# Patient Record
Sex: FEMALE | Race: WHITE | Employment: UNEMPLOYED | URBAN - METROPOLITAN AREA
[De-identification: names, ages, dates, MRNs, and addresses within clinical notes are randomized per-mention and may not be internally consistent; named-entity substitution may affect disease eponyms.]

---

## 2022-07-28 ENCOUNTER — APPOINTMENT (OUTPATIENT)
Dept: CT IMAGING | Age: 87
DRG: 689 | End: 2022-07-28
Payer: MEDICARE

## 2022-07-28 ENCOUNTER — APPOINTMENT (OUTPATIENT)
Dept: GENERAL RADIOLOGY | Age: 87
DRG: 689 | End: 2022-07-28
Payer: MEDICARE

## 2022-07-28 ENCOUNTER — HOSPITAL ENCOUNTER (INPATIENT)
Age: 87
LOS: 4 days | Discharge: NURSING, REHAB OR LONG-TERM CARE | DRG: 689 | End: 2022-08-02
Attending: EMERGENCY MEDICINE | Admitting: STUDENT IN AN ORGANIZED HEALTH CARE EDUCATION/TRAINING PROGRAM
Payer: MEDICARE

## 2022-07-28 DIAGNOSIS — E86.0 DEHYDRATION: ICD-10-CM

## 2022-07-28 DIAGNOSIS — R41.82 ALTERED MENTAL STATUS, UNSPECIFIED ALTERED MENTAL STATUS TYPE: ICD-10-CM

## 2022-07-28 DIAGNOSIS — N39.0 URINARY TRACT INFECTION WITH HEMATURIA, SITE UNSPECIFIED: Primary | ICD-10-CM

## 2022-07-28 DIAGNOSIS — R31.9 URINARY TRACT INFECTION WITH HEMATURIA, SITE UNSPECIFIED: Primary | ICD-10-CM

## 2022-07-28 LAB
-: ABNORMAL
ABSOLUTE EOS #: 0.2 K/UL (ref 0–0.4)
ABSOLUTE LYMPH #: 1.7 K/UL (ref 1–4.8)
ABSOLUTE MONO #: 0.6 K/UL (ref 0.1–1.2)
ALBUMIN SERPL-MCNC: 3.4 G/DL (ref 3.5–5.2)
ALBUMIN/GLOBULIN RATIO: 1.5 (ref 1–2.5)
ALP BLD-CCNC: 85 U/L (ref 35–104)
ALT SERPL-CCNC: 10 U/L (ref 5–33)
ANION GAP SERPL CALCULATED.3IONS-SCNC: 11 MMOL/L (ref 9–17)
AST SERPL-CCNC: 24 U/L
BACTERIA: ABNORMAL
BASOPHILS # BLD: 0 % (ref 0–2)
BASOPHILS ABSOLUTE: 0 K/UL (ref 0–0.2)
BILIRUB SERPL-MCNC: 0.6 MG/DL (ref 0.3–1.2)
BILIRUBIN URINE: ABNORMAL
BUN BLDV-MCNC: 19 MG/DL (ref 8–23)
CALCIUM SERPL-MCNC: 10.2 MG/DL (ref 8.6–10.4)
CASTS UA: ABNORMAL /LPF
CASTS UA: ABNORMAL /LPF
CHLORIDE BLD-SCNC: 105 MMOL/L (ref 98–107)
CO2: 28 MMOL/L (ref 20–31)
COLOR: YELLOW
CREAT SERPL-MCNC: 1.07 MG/DL (ref 0.5–0.9)
EOSINOPHILS RELATIVE PERCENT: 4 % (ref 1–4)
EPITHELIAL CELLS UA: ABNORMAL /HPF (ref 0–5)
GFR AFRICAN AMERICAN: 59 ML/MIN
GFR NON-AFRICAN AMERICAN: 49 ML/MIN
GFR SERPL CREATININE-BSD FRML MDRD: ABNORMAL ML/MIN/{1.73_M2}
GLUCOSE BLD-MCNC: 98 MG/DL (ref 70–99)
GLUCOSE URINE: NEGATIVE
HCT VFR BLD CALC: 26.9 % (ref 36–46)
HEMOGLOBIN: 9 G/DL (ref 12–16)
INR BLD: 1
KETONES, URINE: ABNORMAL
LACTIC ACID, SEPSIS: 0.9 MMOL/L (ref 0.5–1.9)
LEUKOCYTE ESTERASE, URINE: NEGATIVE
LIPASE: 48 U/L (ref 13–60)
LYMPHOCYTES # BLD: 36 % (ref 24–44)
MAGNESIUM: 1.4 MG/DL (ref 1.6–2.6)
MCH RBC QN AUTO: 29.4 PG (ref 26–34)
MCHC RBC AUTO-ENTMCNC: 33.6 G/DL (ref 31–37)
MCV RBC AUTO: 87.4 FL (ref 80–100)
MONOCYTES # BLD: 12 % (ref 2–11)
NITRITE, URINE: NEGATIVE
OTHER OBSERVATIONS UA: ABNORMAL
PARTIAL THROMBOPLASTIN TIME: 23.2 SEC (ref 21.3–31.3)
PDW BLD-RTO: 19.2 % (ref 12.5–15.4)
PH UA: 6 (ref 5–8)
PLATELET # BLD: 339 K/UL (ref 140–450)
PMV BLD AUTO: 9.1 FL (ref 6–12)
POTASSIUM SERPL-SCNC: 3.3 MMOL/L (ref 3.7–5.3)
PROTEIN UA: ABNORMAL
PROTHROMBIN TIME: 10.2 SEC (ref 9.4–12.6)
RBC # BLD: 3.08 M/UL (ref 4–5.2)
RBC UA: ABNORMAL /HPF (ref 0–2)
SEG NEUTROPHILS: 48 % (ref 36–66)
SEGMENTED NEUTROPHILS ABSOLUTE COUNT: 2.3 K/UL (ref 1.8–7.7)
SODIUM BLD-SCNC: 144 MMOL/L (ref 135–144)
SPECIFIC GRAVITY UA: 1.02 (ref 1–1.03)
TOTAL PROTEIN: 5.6 G/DL (ref 6.4–8.3)
TROPONIN, HIGH SENSITIVITY: 85 NG/L (ref 0–14)
TURBIDITY: ABNORMAL
URINE HGB: NEGATIVE
UROBILINOGEN, URINE: NORMAL
WBC # BLD: 4.8 K/UL (ref 3.5–11)
WBC UA: ABNORMAL /HPF (ref 0–5)

## 2022-07-28 PROCEDURE — 83735 ASSAY OF MAGNESIUM: CPT

## 2022-07-28 PROCEDURE — 70450 CT HEAD/BRAIN W/O DYE: CPT

## 2022-07-28 PROCEDURE — 80053 COMPREHEN METABOLIC PANEL: CPT

## 2022-07-28 PROCEDURE — 84484 ASSAY OF TROPONIN QUANT: CPT

## 2022-07-28 PROCEDURE — 99285 EMERGENCY DEPT VISIT HI MDM: CPT

## 2022-07-28 PROCEDURE — 83690 ASSAY OF LIPASE: CPT

## 2022-07-28 PROCEDURE — 85730 THROMBOPLASTIN TIME PARTIAL: CPT

## 2022-07-28 PROCEDURE — 96365 THER/PROPH/DIAG IV INF INIT: CPT

## 2022-07-28 PROCEDURE — 2580000003 HC RX 258: Performed by: EMERGENCY MEDICINE

## 2022-07-28 PROCEDURE — 81001 URINALYSIS AUTO W/SCOPE: CPT

## 2022-07-28 PROCEDURE — 85610 PROTHROMBIN TIME: CPT

## 2022-07-28 PROCEDURE — 36415 COLL VENOUS BLD VENIPUNCTURE: CPT

## 2022-07-28 PROCEDURE — 85025 COMPLETE CBC W/AUTO DIFF WBC: CPT

## 2022-07-28 PROCEDURE — 71045 X-RAY EXAM CHEST 1 VIEW: CPT

## 2022-07-28 PROCEDURE — 83605 ASSAY OF LACTIC ACID: CPT

## 2022-07-28 PROCEDURE — 70496 CT ANGIOGRAPHY HEAD: CPT

## 2022-07-28 PROCEDURE — 6360000004 HC RX CONTRAST MEDICATION: Performed by: EMERGENCY MEDICINE

## 2022-07-28 PROCEDURE — 93005 ELECTROCARDIOGRAM TRACING: CPT | Performed by: EMERGENCY MEDICINE

## 2022-07-28 RX ORDER — ACETAMINOPHEN 325 MG/1
650 TABLET ORAL EVERY 6 HOURS PRN
COMMUNITY

## 2022-07-28 RX ORDER — MAGNESIUM OXIDE 400 MG/1
400 TABLET ORAL DAILY
COMMUNITY

## 2022-07-28 RX ORDER — 0.9 % SODIUM CHLORIDE 0.9 %
1000 INTRAVENOUS SOLUTION INTRAVENOUS ONCE
Status: COMPLETED | OUTPATIENT
Start: 2022-07-28 | End: 2022-07-28

## 2022-07-28 RX ORDER — LEVOFLOXACIN 5 MG/ML
500 INJECTION, SOLUTION INTRAVENOUS ONCE
Status: COMPLETED | OUTPATIENT
Start: 2022-07-28 | End: 2022-07-29

## 2022-07-28 RX ORDER — ONDANSETRON 4 MG/1
4 TABLET, FILM COATED ORAL EVERY 8 HOURS PRN
COMMUNITY

## 2022-07-28 RX ORDER — 0.9 % SODIUM CHLORIDE 0.9 %
80 INTRAVENOUS SOLUTION INTRAVENOUS ONCE
Status: DISCONTINUED | OUTPATIENT
Start: 2022-07-28 | End: 2022-07-29

## 2022-07-28 RX ORDER — SODIUM CHLORIDE 0.9 % (FLUSH) 0.9 %
10 SYRINGE (ML) INJECTION PRN
Status: DISCONTINUED | OUTPATIENT
Start: 2022-07-28 | End: 2022-07-29

## 2022-07-28 RX ORDER — OMEPRAZOLE 20 MG/1
20 CAPSULE, DELAYED RELEASE ORAL 2 TIMES DAILY
COMMUNITY

## 2022-07-28 RX ORDER — NITROFURANTOIN 25; 75 MG/1; MG/1
100 CAPSULE ORAL 2 TIMES DAILY
Status: ON HOLD | COMMUNITY
End: 2022-08-01 | Stop reason: HOSPADM

## 2022-07-28 RX ADMIN — SODIUM CHLORIDE 1000 ML: 9 INJECTION, SOLUTION INTRAVENOUS at 22:15

## 2022-07-28 RX ADMIN — IOPAMIDOL 100 ML: 755 INJECTION, SOLUTION INTRAVENOUS at 23:54

## 2022-07-28 RX ADMIN — Medication 80 ML: at 23:57

## 2022-07-28 RX ADMIN — SODIUM CHLORIDE, PRESERVATIVE FREE 10 ML: 5 INJECTION INTRAVENOUS at 23:58

## 2022-07-28 ASSESSMENT — ENCOUNTER SYMPTOMS
ABDOMINAL PAIN: 0
DIARRHEA: 0
BACK PAIN: 0
COUGH: 0
VOMITING: 0

## 2022-07-29 ENCOUNTER — APPOINTMENT (OUTPATIENT)
Dept: CT IMAGING | Age: 87
DRG: 689 | End: 2022-07-29
Payer: MEDICARE

## 2022-07-29 PROBLEM — G92.9 TOXIC ENCEPHALOPATHY: Status: ACTIVE | Noted: 2022-07-29

## 2022-07-29 PROBLEM — C18.8 OVERLAPPING MALIGNANT NEOPLASM OF COLON (HCC): Status: ACTIVE | Noted: 2022-07-29

## 2022-07-29 PROBLEM — I82.409 DVT (DEEP VENOUS THROMBOSIS) (HCC): Status: ACTIVE | Noted: 2022-07-29

## 2022-07-29 PROBLEM — R41.82 CHANGE IN MENTAL STATUS: Status: ACTIVE | Noted: 2022-07-29

## 2022-07-29 LAB
ABSOLUTE EOS #: 0.1 K/UL (ref 0–0.4)
ABSOLUTE LYMPH #: 1 K/UL (ref 1–4.8)
ABSOLUTE MONO #: 0.4 K/UL (ref 0.1–1.2)
ANION GAP SERPL CALCULATED.3IONS-SCNC: 11 MMOL/L (ref 9–17)
BASOPHILS # BLD: 1 % (ref 0–2)
BASOPHILS ABSOLUTE: 0 K/UL (ref 0–0.2)
BUN BLDV-MCNC: 16 MG/DL (ref 8–23)
CALCIUM SERPL-MCNC: 9.4 MG/DL (ref 8.6–10.4)
CHLORIDE BLD-SCNC: 107 MMOL/L (ref 98–107)
CO2: 25 MMOL/L (ref 20–31)
CREAT SERPL-MCNC: 0.97 MG/DL (ref 0.5–0.9)
EOSINOPHILS RELATIVE PERCENT: 3 % (ref 1–4)
GFR AFRICAN AMERICAN: >60 ML/MIN
GFR NON-AFRICAN AMERICAN: 54 ML/MIN
GFR SERPL CREATININE-BSD FRML MDRD: ABNORMAL ML/MIN/{1.73_M2}
GLUCOSE BLD-MCNC: 87 MG/DL (ref 70–99)
HCT VFR BLD CALC: 23.3 % (ref 36–46)
HCT VFR BLD CALC: 24.5 % (ref 36–46)
HEMOGLOBIN: 7.7 G/DL (ref 12–16)
HEMOGLOBIN: 8.2 G/DL (ref 12–16)
INR BLD: 1.1
LYMPHOCYTES # BLD: 24 % (ref 24–44)
MAGNESIUM: 1.2 MG/DL (ref 1.6–2.6)
MCH RBC QN AUTO: 29.4 PG (ref 26–34)
MCH RBC QN AUTO: 29.7 PG (ref 26–34)
MCHC RBC AUTO-ENTMCNC: 33.1 G/DL (ref 31–37)
MCHC RBC AUTO-ENTMCNC: 33.4 G/DL (ref 31–37)
MCV RBC AUTO: 89 FL (ref 80–100)
MCV RBC AUTO: 89 FL (ref 80–100)
MONOCYTES # BLD: 9 % (ref 2–11)
PARTIAL THROMBOPLASTIN TIME: 22.1 SEC (ref 21.3–31.3)
PDW BLD-RTO: 19 % (ref 12.5–15.4)
PDW BLD-RTO: 19.1 % (ref 12.5–15.4)
PLATELET # BLD: 298 K/UL (ref 140–450)
PLATELET # BLD: 308 K/UL (ref 140–450)
PMV BLD AUTO: 8.4 FL (ref 6–12)
PMV BLD AUTO: 8.7 FL (ref 6–12)
POTASSIUM SERPL-SCNC: 3.2 MMOL/L (ref 3.7–5.3)
PROTHROMBIN TIME: 11.4 SEC (ref 9.4–12.6)
RBC # BLD: 2.62 M/UL (ref 4–5.2)
RBC # BLD: 2.76 M/UL (ref 4–5.2)
SEG NEUTROPHILS: 63 % (ref 36–66)
SEGMENTED NEUTROPHILS ABSOLUTE COUNT: 2.8 K/UL (ref 1.8–7.7)
SODIUM BLD-SCNC: 143 MMOL/L (ref 135–144)
TROPONIN, HIGH SENSITIVITY: 89 NG/L (ref 0–14)
TROPONIN, HIGH SENSITIVITY: 90 NG/L (ref 0–14)
TROPONIN, HIGH SENSITIVITY: 91 NG/L (ref 0–14)
WBC # BLD: 3.9 K/UL (ref 3.5–11)
WBC # BLD: 4.4 K/UL (ref 3.5–11)

## 2022-07-29 PROCEDURE — 36415 COLL VENOUS BLD VENIPUNCTURE: CPT

## 2022-07-29 PROCEDURE — 99222 1ST HOSP IP/OBS MODERATE 55: CPT | Performed by: STUDENT IN AN ORGANIZED HEALTH CARE EDUCATION/TRAINING PROGRAM

## 2022-07-29 PROCEDURE — 85025 COMPLETE CBC W/AUTO DIFF WBC: CPT

## 2022-07-29 PROCEDURE — 85610 PROTHROMBIN TIME: CPT

## 2022-07-29 PROCEDURE — 97166 OT EVAL MOD COMPLEX 45 MIN: CPT

## 2022-07-29 PROCEDURE — 85027 COMPLETE CBC AUTOMATED: CPT

## 2022-07-29 PROCEDURE — 1210000000 HC MED SURG R&B

## 2022-07-29 PROCEDURE — 97162 PT EVAL MOD COMPLEX 30 MIN: CPT

## 2022-07-29 PROCEDURE — 2580000003 HC RX 258: Performed by: STUDENT IN AN ORGANIZED HEALTH CARE EDUCATION/TRAINING PROGRAM

## 2022-07-29 PROCEDURE — 93308 TTE F-UP OR LMTD: CPT

## 2022-07-29 PROCEDURE — 80048 BASIC METABOLIC PNL TOTAL CA: CPT

## 2022-07-29 PROCEDURE — 83735 ASSAY OF MAGNESIUM: CPT

## 2022-07-29 PROCEDURE — 97530 THERAPEUTIC ACTIVITIES: CPT

## 2022-07-29 PROCEDURE — 97535 SELF CARE MNGMENT TRAINING: CPT

## 2022-07-29 PROCEDURE — 2580000003 HC RX 258: Performed by: CLINICAL NURSE SPECIALIST

## 2022-07-29 PROCEDURE — 6360000002 HC RX W HCPCS: Performed by: EMERGENCY MEDICINE

## 2022-07-29 PROCEDURE — 85730 THROMBOPLASTIN TIME PARTIAL: CPT

## 2022-07-29 PROCEDURE — 94760 N-INVAS EAR/PLS OXIMETRY 1: CPT

## 2022-07-29 PROCEDURE — 6360000002 HC RX W HCPCS: Performed by: CLINICAL NURSE SPECIALIST

## 2022-07-29 PROCEDURE — 93321 DOPPLER ECHO F-UP/LMTD STD: CPT

## 2022-07-29 PROCEDURE — 74176 CT ABD & PELVIS W/O CONTRAST: CPT

## 2022-07-29 PROCEDURE — 6360000002 HC RX W HCPCS: Performed by: STUDENT IN AN ORGANIZED HEALTH CARE EDUCATION/TRAINING PROGRAM

## 2022-07-29 RX ORDER — SODIUM CHLORIDE 0.9 % (FLUSH) 0.9 %
10 SYRINGE (ML) INJECTION PRN
Status: DISCONTINUED | OUTPATIENT
Start: 2022-07-29 | End: 2022-08-02 | Stop reason: HOSPADM

## 2022-07-29 RX ORDER — POLYETHYLENE GLYCOL 3350 17 G/17G
17 POWDER, FOR SOLUTION ORAL DAILY PRN
Status: DISCONTINUED | OUTPATIENT
Start: 2022-07-29 | End: 2022-08-02 | Stop reason: HOSPADM

## 2022-07-29 RX ORDER — SODIUM CHLORIDE 9 MG/ML
INJECTION, SOLUTION INTRAVENOUS CONTINUOUS
Status: DISCONTINUED | OUTPATIENT
Start: 2022-07-29 | End: 2022-08-01

## 2022-07-29 RX ORDER — ACETAMINOPHEN 325 MG/1
650 TABLET ORAL EVERY 6 HOURS PRN
Status: DISCONTINUED | OUTPATIENT
Start: 2022-07-29 | End: 2022-07-29 | Stop reason: SDUPTHER

## 2022-07-29 RX ORDER — HEPARIN SODIUM 1000 [USP'U]/ML
80 INJECTION, SOLUTION INTRAVENOUS; SUBCUTANEOUS ONCE
Status: COMPLETED | OUTPATIENT
Start: 2022-07-29 | End: 2022-07-29

## 2022-07-29 RX ORDER — POTASSIUM CHLORIDE 7.45 MG/ML
10 INJECTION INTRAVENOUS PRN
Status: DISCONTINUED | OUTPATIENT
Start: 2022-07-29 | End: 2022-08-02 | Stop reason: HOSPADM

## 2022-07-29 RX ORDER — ENOXAPARIN SODIUM 100 MG/ML
1.5 INJECTION SUBCUTANEOUS DAILY
Status: DISCONTINUED | OUTPATIENT
Start: 2022-07-29 | End: 2022-07-29

## 2022-07-29 RX ORDER — ENOXAPARIN SODIUM 100 MG/ML
50 INJECTION SUBCUTANEOUS ONCE
Status: COMPLETED | OUTPATIENT
Start: 2022-07-29 | End: 2022-07-29

## 2022-07-29 RX ORDER — ACETAMINOPHEN 325 MG/1
650 TABLET ORAL EVERY 6 HOURS PRN
Status: DISCONTINUED | OUTPATIENT
Start: 2022-07-29 | End: 2022-08-02 | Stop reason: HOSPADM

## 2022-07-29 RX ORDER — SODIUM CHLORIDE 0.9 % (FLUSH) 0.9 %
5-40 SYRINGE (ML) INJECTION EVERY 12 HOURS SCHEDULED
Status: DISCONTINUED | OUTPATIENT
Start: 2022-07-29 | End: 2022-08-02 | Stop reason: HOSPADM

## 2022-07-29 RX ORDER — HEPARIN SODIUM 10000 [USP'U]/100ML
5-30 INJECTION, SOLUTION INTRAVENOUS CONTINUOUS
Status: DISCONTINUED | OUTPATIENT
Start: 2022-07-29 | End: 2022-08-01

## 2022-07-29 RX ORDER — HEPARIN SODIUM 1000 [USP'U]/ML
80 INJECTION, SOLUTION INTRAVENOUS; SUBCUTANEOUS PRN
Status: DISCONTINUED | OUTPATIENT
Start: 2022-07-29 | End: 2022-08-01

## 2022-07-29 RX ORDER — PANTOPRAZOLE SODIUM 40 MG/1
40 TABLET, DELAYED RELEASE ORAL
Status: DISCONTINUED | OUTPATIENT
Start: 2022-07-29 | End: 2022-08-02 | Stop reason: HOSPADM

## 2022-07-29 RX ORDER — LANOLIN ALCOHOL/MO/W.PET/CERES
400 CREAM (GRAM) TOPICAL DAILY
Status: DISCONTINUED | OUTPATIENT
Start: 2022-07-29 | End: 2022-08-02 | Stop reason: HOSPADM

## 2022-07-29 RX ORDER — POTASSIUM CHLORIDE 20 MEQ/1
40 TABLET, EXTENDED RELEASE ORAL PRN
Status: DISCONTINUED | OUTPATIENT
Start: 2022-07-29 | End: 2022-08-02 | Stop reason: HOSPADM

## 2022-07-29 RX ORDER — ROSUVASTATIN CALCIUM 5 MG/1
10 TABLET, COATED ORAL NIGHTLY
Status: DISCONTINUED | OUTPATIENT
Start: 2022-07-29 | End: 2022-08-02 | Stop reason: HOSPADM

## 2022-07-29 RX ORDER — ACETAMINOPHEN 650 MG/1
650 SUPPOSITORY RECTAL EVERY 6 HOURS PRN
Status: DISCONTINUED | OUTPATIENT
Start: 2022-07-29 | End: 2022-08-02 | Stop reason: HOSPADM

## 2022-07-29 RX ORDER — POTASSIUM CHLORIDE 7.45 MG/ML
10 INJECTION INTRAVENOUS
Status: COMPLETED | OUTPATIENT
Start: 2022-07-29 | End: 2022-07-29

## 2022-07-29 RX ORDER — SODIUM CHLORIDE 9 MG/ML
INJECTION, SOLUTION INTRAVENOUS PRN
Status: DISCONTINUED | OUTPATIENT
Start: 2022-07-29 | End: 2022-08-02 | Stop reason: HOSPADM

## 2022-07-29 RX ORDER — ONDANSETRON 4 MG/1
4 TABLET, FILM COATED ORAL EVERY 8 HOURS PRN
Status: DISCONTINUED | OUTPATIENT
Start: 2022-07-29 | End: 2022-08-02 | Stop reason: HOSPADM

## 2022-07-29 RX ORDER — ENOXAPARIN SODIUM 100 MG/ML
1 INJECTION SUBCUTANEOUS ONCE
Status: CANCELLED | OUTPATIENT
Start: 2022-07-29 | End: 2022-07-29

## 2022-07-29 RX ORDER — MAGNESIUM SULFATE 1 G/100ML
1000 INJECTION INTRAVENOUS PRN
Status: DISCONTINUED | OUTPATIENT
Start: 2022-07-29 | End: 2022-08-02 | Stop reason: HOSPADM

## 2022-07-29 RX ORDER — HEPARIN SODIUM 1000 [USP'U]/ML
40 INJECTION, SOLUTION INTRAVENOUS; SUBCUTANEOUS PRN
Status: DISCONTINUED | OUTPATIENT
Start: 2022-07-29 | End: 2022-08-01

## 2022-07-29 RX ADMIN — POTASSIUM CHLORIDE 10 MEQ: 7.46 INJECTION, SOLUTION INTRAVENOUS at 20:27

## 2022-07-29 RX ADMIN — MAGNESIUM SULFATE HEPTAHYDRATE 1000 MG: 1 INJECTION, SOLUTION INTRAVENOUS at 12:54

## 2022-07-29 RX ADMIN — CEFTRIAXONE SODIUM 1000 MG: 1 INJECTION, POWDER, FOR SOLUTION INTRAMUSCULAR; INTRAVENOUS at 12:07

## 2022-07-29 RX ADMIN — POTASSIUM CHLORIDE 10 MEQ: 7.46 INJECTION, SOLUTION INTRAVENOUS at 18:35

## 2022-07-29 RX ADMIN — MAGNESIUM SULFATE HEPTAHYDRATE 1000 MG: 1 INJECTION, SOLUTION INTRAVENOUS at 15:19

## 2022-07-29 RX ADMIN — HEPARIN SODIUM AND DEXTROSE 18 UNITS/KG/HR: 10000; 5 INJECTION INTRAVENOUS at 17:14

## 2022-07-29 RX ADMIN — HEPARIN SODIUM 3940 UNITS: 1000 INJECTION INTRAVENOUS; SUBCUTANEOUS at 17:14

## 2022-07-29 RX ADMIN — POTASSIUM CHLORIDE 10 MEQ: 7.46 INJECTION, SOLUTION INTRAVENOUS at 19:34

## 2022-07-29 RX ADMIN — MAGNESIUM SULFATE HEPTAHYDRATE 1000 MG: 1 INJECTION, SOLUTION INTRAVENOUS at 10:09

## 2022-07-29 RX ADMIN — SODIUM CHLORIDE: 9 INJECTION, SOLUTION INTRAVENOUS at 17:00

## 2022-07-29 RX ADMIN — SODIUM CHLORIDE: 9 INJECTION, SOLUTION INTRAVENOUS at 03:43

## 2022-07-29 RX ADMIN — ENOXAPARIN SODIUM 50 MG: 100 INJECTION SUBCUTANEOUS at 03:41

## 2022-07-29 RX ADMIN — SODIUM CHLORIDE, PRESERVATIVE FREE 10 ML: 5 INJECTION INTRAVENOUS at 09:55

## 2022-07-29 RX ADMIN — LEVOFLOXACIN 500 MG: 5 INJECTION, SOLUTION INTRAVENOUS at 00:08

## 2022-07-29 RX ADMIN — POTASSIUM CHLORIDE 10 MEQ: 7.46 INJECTION, SOLUTION INTRAVENOUS at 17:21

## 2022-07-29 RX ADMIN — MAGNESIUM SULFATE HEPTAHYDRATE 1000 MG: 1 INJECTION, SOLUTION INTRAVENOUS at 16:20

## 2022-07-29 NOTE — PROGRESS NOTES
Pt failed bedside swallow test at this time; daughter Glory Meadows informed Dovie Opitz that pt has recently had difficulty swallowing; Dr Farrah Scott updated

## 2022-07-29 NOTE — PROGRESS NOTES
Admitted from ER to room 346. Pt alert, but not answering writers questions; appears confused / just says \"Help\". Support and reassurance provided for pt. Admission documentation initiated; vital signs obtained; initial assessment completed; warm blanket provided for pt; bed alarm on; pt oriented to call light and safety precautions - needs reinforcement; writer will contact family for more information on pt.    Pt currently resting comfortably; continue to monitor

## 2022-07-29 NOTE — PROGRESS NOTES
Occupational Therapy  Facility/Department: [facility] [place]  Occupational Therapy Initial Assessment    Name: Radha Garcia  : 1935  MRN: [id number]  Date of Service: 2022    Chief Complaint   Patient presents with    Altered Mental Status     Brought by squad- altered mental status d/t untreated UTI -pt is refusing abx        Discharge Recommendations:  Patient would benefit from continued therapy after discharge       Patient Diagnosis(es): The primary encounter diagnosis was Urinary tract infection with hematuria, site unspecified. Diagnoses of Altered mental status, unspecified altered mental status type and Dehydration were also pertinent to this visit. Past Medical History:  has a past medical history of Anemia, Atrial fibrillation ([address] Cognitive communication deficit, Colon cancer ([facility] [address] Deep venous thrombosis of lower extremity ([address] GI bleed, Hyperlipidemia, Malnutrition ([facility] [address] Muscle weakness, and Rhabdomyolysis. Past Surgical History:  has a past surgical history that includes Colon surgery. Assessment   Performance deficits / Impairments: Decreased functional mobility ; Decreased endurance;Decreased coordination;Decreased ADL status; Decreased ROM; Decreased balance;Decreased strength;Decreased high-level IADLs;Decreased safe awareness;Decreased cognition;Decreased fine motor control  Assessment: Pt ability to perform functional tasks significantly limited by deficits listed above also including pain. Pt would benefit from continued skilled occupational therapy throughout acute care stay and at discharge for increasing participation in functional tasks. Based on current level of function, pt requires 24hr assist/supervision. Prognosis: Good  Decision Making: Medium Complexity  REQUIRES OT FOLLOW-UP: Yes  Activity Tolerance  Activity Tolerance: Patient limited by pain; Patient limited by fatigue;Patient Tolerated treatment well;Treatment limited secondary to decreased cognition        Education Given To: Patient; Family  Education Provided: Role of Therapy;Plan of Care;Transfer Training  Education Method: Verbal  Barriers to Learning: None  Education Outcome: Unable to demonstrate understanding;Continued education needed    Safety Devices  Type of Devices: Bed alarm in place;Call light within reach;Gait belt;Patient at risk for falls; Left in bed;Nurse notified  Restraints  Restraints Initially in Place: No      Plan   Plan  Times per Week: 4-6x/wk  Times per Day: Daily  Current Treatment Recommendations: Strengthening, Balance training, Endurance training, Functional mobility training, Patient/Caregiver education & training, Safety education & training, Pain management, Self-Care / ADL, Home management training       Restrictions  Restrictions/Precautions  Restrictions/Precautions: Fall Risk  Required Braces or Orthoses?: No  Position Activity Restriction  Other position/activity restrictions: Up with assist      Subjective   General  Patient assessed for rehabilitation services?: Yes  General Comment  Comments: RN ok'd pt to be seen for therapy this PM. Pt was pleasantly confused throughout with periodic tearfulness about son. Pt unable to provide numeric score for pain. Social/Functional History  Social/Functional History  Lives With: Alone  Type of Home: Condo  Home Layout: One level  Home Access: Stairs to enter with rails  Entrance Stairs - Number of Steps: 2  Entrance Stairs - Rails:  (has handhold/grab bar)  Home Equipment: nj [place] Walker, 4 wheeled  Receives Help From: Family  ADL Assistance: Independent  Homemaking Assistance: Independent  Ambulation Assistance: Independent  Transfer Assistance: Independent  Active : No  Patient's  Info: has been several months since she has driven; family transports  Occupation: Retired  Additional Comments: Pt had colon CA surgery in June and has not been home since.  Pt has been at [address] [facility] for rehab and plans to return there at discharge. Objective   Vision  Vision: Impaired  Vision Exceptions: Wears glasses for reading  Hearing  Hearing: Within functional limits    Bed Mobility Training  Bed Mobility Training: Yes  Overall Level of Assistance: Assist X2;Maximum assistance  Interventions: Verbal cues (initiation)  Supine to Sit: Maximum assistance;Assist X2;Additional time; Adaptive equipment (Pt performed supine>sit with max Ax2 with HOB raised ~40*. Pt with difficulty intitating task.)  Sit to Supine: Maximum assistance; Adaptive equipment; Additional time;Assist X2 (Pt performed sit>supine with max Ax2 with HOB raised ~40*. Pt with difficulty intitating task.)    Balance  Sitting: Intact (Pt seated EOB unsupported for ~12min both statically and dynamically.)  Standing: Impaired (Pt stood EOB ~45-60s with max Ax2 with [place] for balance.)    Transfer Training  Transfer Training: Yes  Overall Level of Assistance: Maximum assistance;Assist X2;Additional time  Interventions: Verbal cues (Initiation)  Sit to Stand: Maximum assistance; Additional time;Assist X2 (Pt performed sit>stand from EOB with max Ax2 with [place] to perform. Pt with additional time to complete due to difficulty initiating task.)  Stand to Sit: Additional time;Maximum assistance;Assist X2 (Pt performed stand>sit wth max Ax2 with [place] onto EOB. Pt returned to sitting after asking to sit.)    Functional Mobility  Overall Level of Assistance:  (Further OOB activity not attempted due to poor standing balance.)    AROM: Generally decreased, functional (Pt with difficulty following commands for AROM.  Pt able to perform shoulder flexion [facility].)  Strength: Grossly decreased, non-functional (Not able to test due to cognitive limitations)  Coordination: Grossly decreased, non-functional (Not able to test due to cognitive limitations)  Tone: Normal (BUE)  Sensation: Impaired (Pt unable to state numbness/tingling)    ADL  Feeding: Verbal cueing;Maximum assistance;Setup  Grooming: Maximum assistance;Verbal cueing; Increased time to complete; Adaptive equipment  Grooming Skilled Clinical Factors: Pt performed face washing seated EOB. Therapist placed washcloth in pt hand and directed pt to wash face. Pt able to perform face washing with max VC and periodic [place]. UE Bathing: Maximum assistance;Verbal cueing; Increased time to complete; Adaptive equipment  LE Bathing: Maximum assistance;Verbal cueing; Increased time to complete; Adaptive equipment  UE Dressing: Maximum assistance;Verbal cueing; Adaptive equipment; Increased time to complete  LE Dressing: Maximum assistance;Verbal cueing; Increased time to complete; Adaptive equipment  Toileting: Maximum assistance; Increased time to complete; Adaptive equipment    Cognition  Overall Cognitive Status: Exceptions  Arousal/Alertness: Delayed responses to stimuli  Following Commands: Follows one step commands with increased time  Attention Span: Attends with cues to redirect; Difficulty attending to directions  Safety Judgement: Decreased awareness of need for assistance;Decreased awareness of need for safety  Insights: Decreased awareness of deficits  Initiation: Requires cues for all  Sequencing: Requires cues for all    Orientation  Overall Orientation Status: Impaired  Orientation Level: Oriented to person;Disoriented to place; Disoriented to time      AM-PAC Score  AM-[facility] Inpatient Daily Activity Raw Score: 12 (07/29/22 1530)  AM-PAC Inpatient ADL T-Scale Score : 30.6 (07/29/22 1530)  ADL Inpatient CMS 0-100% Score: 66.57 (07/29/22 1530)  ADL Inpatient CMS G-Code Modifier : CL (07/29/22 1530)      Goals  Short Term Goals  Time Frame for Short term goals: by discharge  Short Term Goal 1: pt will tolerate 3+min of standing during functional task performance with use of least restrictive AD  Short Term Goal 2: pt will perform UB ADLs with min A and min VC with use of least restrictive AE/DME  Short Term Goal 3: pt will perform LB ADLs with mod A and min VC with use of least restrictive AE/DME  Short Term Goal 4: pt will perform functional transfer/mobility with min A and use of least restrictive AD  Short Term Goal 5: pt will perform table top activity in sitting unsupported for 15+min with min VC      Therapy Time   Individual Concurrent Group Co-treatment   Time In 1353         Time Out 1430         Minutes 37         Timed Code Treatment Minutes: 10 Minutes    Evaluation/treatment performed by Student OT under the supervision of co-signing OT who agrees with all evaluation/treatment and documentation.      Varsha Leon S/OT

## 2022-07-29 NOTE — ED NOTES
Pt transferred to inpatient bed for comfort.  Assisted pt with repositioning onto right side, assisted pt with swabbing mouth and chap stick, call light in reach      Quillian Gosselin, RN  07/29/22 [address] [facility] [address], RN  07/29/22 2401

## 2022-07-29 NOTE — CARE COORDINATION
07/29/22 1430   Service Assessment   Patient Orientation Unable to Assess   Cognition Dementia / Early Alzheimer's   [address] is: Legal Next of Kin   Social/Functional History   Lives With Alone   Type of [address] One [address]   [facility] [address] to enter with rails   Entrance Stairs - Number of Steps 2   [address], rolling;Walker, 4 wheeled   [address]   Transfer Assistance Independent   Active  No   Patient's [place] has been several months since she has driven; family transports   Occupation Retired   Discharge Planning   Patient expects to be discharged to: [address] Dis[address]   [address] Discharge [facility] (SNF)   Mode of Transport at Discharge BLS   Condition of Participation: Discharge Planning   The Patient and/or Patient Representative was provided with a Choice of Provider? Patient   The Patient and/Or Patient Representative agree with the Discharge Plan? Yes   Freedom of Choice list was provided with basic dialogue that supports the patient's individualized plan of care/goals, treatment preferences, and shares the quality data associated with the providers?   Yes       Pt was at [address] [place] [address] for skilled, daughter unsure if she wants patient to return

## 2022-07-29 NOTE — ED NOTES
Depends saturated, karsten care done, pt turned and repositioned into right side, call light in reach, will continue to monitor      Amarilis Peres RN  07/29/22 [id number]

## 2022-07-29 NOTE — ED PROVIDER NOTES
[id number] [facility] ED  [address] [address] [facility] [address] [facility] [address]  Phone: [phone]  Fax: [phone] [facility]          Pt Name: Rosalva Mock  MRN: [id number]  [place] 1935  Date of evaluation: 7/28/2022      CHIEF COMPLAINT       Chief Complaint   Patient presents with    Altered Mental Status     Brought by squad- altered mental status d/t untreated UTI -pt is refusing abx        HISTORY OF PRESENT ILLNESS       Rosalva Mock is a 80 y.o. female who presents with altered mental status for about the past 2 to 3 days. She has had decreased communication and now refusing to take her medications. The nursing care facility thought that she may have a urinary tract infection however they have not yet been able to obtain a sample. No trauma. She is currently in the nursing care facility for rehab. At the end of June of this year she was living independently and when she had a fall at that time was diagnosed with colon cancer. Had a partial colectomy that is healing well. Just had the rest of her staples removed. 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 No further trauma reported. Patient is answering questions appropriately intermittently and not all the time. No other symptoms at this time. Review of systems primarily comes from the patient's daughter and EMS/nursing home. REVIEW OF SYSTEMS       Review of Systems   Unable to perform ROS: Mental status change   Constitutional:  Negative for fever. Respiratory:  Negative for cough. Cardiovascular:  Negative for chest pain. Gastrointestinal:  Negative for abdominal pain, diarrhea and vomiting. Musculoskeletal:  Negative for back pain and neck pain. Skin:  Negative for rash. Neurological:  Negative for seizures and headaches. Psychiatric/Behavioral:  Positive for confusion.        PAST MEDICAL HISTORY has a past medical history of Anemia, Atrial fibrillation ([facility] [address] Cognitive communication deficit, Colon cancer ([facility] [address] Deep venous thrombosis of lower extremity ([facility] [address] GI bleed, Hyperlipidemia, Malnutrition ([facility] [address] Muscle weakness, and Rhabdomyolysis. SURGICAL HISTORY      has a past surgical history that includes Colon surgery. CURRENT MEDICATIONS       Previous Medications    ACETAMINOPHEN (TYLENOL) 325 MG TABLET    Take 650 mg by mouth every 6 hours as needed for Pain    APIXABAN (ELIQUIS) 5 MG TABS TABLET    Take 5 mg by mouth in the morning and 5 mg before bedtime. MAGNESIUM OXIDE (MAG-OX) 400 MG TABLET    Take 400 mg by mouth in the morning. NITROFURANTOIN, MACROCRYSTAL-MONOHYDRATE, (MACROBID) 100 MG CAPSULE    Take 100 mg by mouth in the morning and 100 mg before bedtime. OMEPRAZOLE (PRILOSEC) 20 MG DELAYED RELEASE CAPSULE    Take 20 mg by mouth in the morning and 20 mg in the evening. ONDANSETRON (ZOFRAN) 4 MG TABLET    Take 4 mg by mouth every 8 hours as needed for Nausea or Vomiting    ROSUVASTATIN CALCIUM 10 MG CPSP    Take by mouth daily       ALLERGIES     has No Known Allergies. FAMILY HISTORY     has no family status information on file. family history is not on file. SOCIAL HISTORY      reports that she has never smoked. She has never been exposed to tobacco smoke. She has never used smokeless tobacco. She reports that she does not currently use alcohol. PHYSICAL EXAM     INITIAL VITALS:  oral temperature is 97.7 °F (36.5 °C). Her blood pressure is 138/73 and her pulse is 85. Her respiration is 16 and oxygen saturation is 98%. Physical Exam  Vitals reviewed. Constitutional:       General: She is not in acute distress. Appearance: She is well-developed. She is not ill-appearing or toxic-appearing. HENT:      Head: Normocephalic and atraumatic.       Right Ear: External ear normal.      Left Ear: External ear normal.   Eyes:      General: Lids are normal. Neck:      Trachea: No tracheal deviation. Cardiovascular:      Rate and Rhythm: Normal rate and regular rhythm. Pulmonary:      Effort: Pulmonary effort is normal. No respiratory distress. Abdominal:      Palpations: Abdomen is soft. Tenderness: There is no abdominal tenderness. Skin:     General: Skin is warm and dry. Neurological:      Mental Status: She is alert. GCS: GCS eye subscore is 4. GCS verbal subscore is 5. GCS motor subscore is 6. Comments: Patient does not answer my questions however she is awake, alert and looks around. No focal deficits and she has equal  strength bilaterally and is able to wiggle her toes equally bilaterally. Difficult to assess sensation or coordination. Cranial nerves appear to be intact from what I am able to examine. Psychiatric:         Speech: Speech normal.         DIFFERENTIAL DIAGNOSIS/ MDM:     Plan will be to initiate a further altered mental status work-up. She does not appear to be in any distress. Suspicion low for CVA. She is well outside of the tPA window. DIAGNOSTIC RESULTS     EKG: All EKG's are interpreted by the Emergency Department Physician who either signs or Co-signs this chart in the absence of a cardiologist.  Interpreted by Marybel Bruce DO     Rhythm: sinus   Rate: 85  Axis: Normal  Ectopy: Sinus arrhythmia but otherwise no other ectopy. Conduction: Sinus rhythm with sinus arrhythmia. ST Segments: No elevation or depression  T Waves: Generalized flattening of the T waves but otherwise no acute findings. Clinical Impression: Nonspecific ECG. Sinus rhythm. No acute infarction/ischemia noted. RADIOLOGY:   Interpretation per the Radiologist below, if available at the time of this note:  CTA HEAD NECK W CONTRAST   Preliminary Result   No evidence of stenosis or plaques in the bilateral common carotid arteries,   bilateral internal carotid arteries and bilateral vertebral arteries combine   neck. No evidence of any vascular compromise in the bilateral intracranial cerebral   arteries of the anterior and posterior circulations. Multiple nonenhancing hypodense lesions in the thyroid gland, most likely due   to multiple cysts. CT HEAD WO CONTRAST   Final Result   No acute intracranial abnormality. XR CHEST PORTABLE   Final Result   Minor left basilar atelectasis and or pleural effusion. No other acute   abnormality evident. No results found.     LABS:  Results for orders placed or performed during the hospital encounter of 07/28/22   Lactate, Sepsis   Result Value Ref Range    Lactic Acid, Sepsis 0.9 0.5 - 1.9 mmol/L   Comprehensive Metabolic Panel   Result Value Ref Range    Glucose 98 70 - 99 mg/dL    BUN 19 8 - 23 mg/dL    Creatinine 1.07 (H) 0.50 - 0.90 mg/dL    Calcium 10.2 8.6 - 10.4 mg/dL    Sodium 144 135 - 144 mmol/L    Potassium 3.3 (L) 3.7 - 5.3 mmol/L    Chloride 105 98 - 107 mmol/L    CO2 28 20 - 31 mmol/L    Anion Gap 11 9 - 17 mmol/L    Alkaline Phosphatase 85 35 - 104 U/L    ALT 10 5 - 33 U/L    AST 24 <32 U/L    Total Bilirubin 0.60 0.3 - 1.2 mg/dL    Total Protein 5.6 (L) 6.4 - 8.3 g/dL    Albumin 3.4 (L) 3.5 - 5.2 g/dL    Albumin/Globulin Ratio 1.5 1.0 - 2.5    GFR Non- 49 (L) >60 mL/min    GFR  59 (L) >60 mL/min    GFR Comment         CBC with Auto Differential   Result Value Ref Range    WBC 4.8 3.5 - 11.0 k/uL    RBC 3.08 (L) 4.0 - 5.2 m/uL    Hemoglobin 9.0 (L) 12.0 - 16.0 g/dL    Hematocrit 26.9 (L) 36 - 46 %    MCV 87.4 80 - 100 fL    MCH 29.4 26 - 34 pg    MCHC 33.6 31 - 37 g/dL    RDW 19.2 (H) 12.5 - 15.4 %    Platelets 372 696 - 689 k/uL    MPV 9.1 6.0 - 12.0 fL    Seg Neutrophils 48 36 - 66 %    Lymphocytes 36 24 - 44 %    Monocytes 12 (H) 2 - 11 %    Eosinophils % 4 1 - 4 %    Basophils 0 0 - 2 %    Segs Absolute 2.30 1.8 - 7.7 k/uL    Absolute Lymph # 1.70 1.0 - 4.8 k/uL    Absolute Mono # 0.60 0.1 - 1.2 k/uL Tract Infection    iopamidol (ISOVUE-370) 76 % injection 100 mL    sodium chloride flush 0.9 % injection 10 mL    0.9 % sodium chloride bolus        Vitals:    Vitals:    07/28/22 2123 07/28/22 2307   BP: (!) 141/61 138/73   Pulse: 86 85   Resp: 12 16   Temp: 97.7 °F (36.5 °C)    TempSrc: Oral    SpO2: 98% 98%     -------------------------  BP: 138/73, Temp: 97.7 °F (36.5 °C), Heart Rate: 85, Resp: 16      Re-evaluation Notes    Patient doing well on reevaluation. She seems to be more alert and appropriate with some hydration. We will cycle she was somewhat dehydrated. Imaging shows no acute findings. Urinalysis does show some evidence of urinary tract infection. The urine was a cath specimen however does have significant amount of epithelial cells. Despite this we will put the patient on antibiotics. Plan to be to admit. Cardiac enzymes are slightly elevated however no evidence of infarction or ischemia on her ECG at this time. She also denies chest pain. I spoke with the hospitalist who excepted admission of the patient. Patient family agreeable with this plan. We will put the patient on Lovenox since she is not able to take her Eliquis. CONSULTS:    None    CRITICAL CARE:     None    PROCEDURES:    None    FINAL IMPRESSION      1. Urinary tract infection with hematuria, site unspecified    2. Altered mental status, unspecified altered mental status type    3. Dehydration          DISPOSITION/PLAN   DISPOSITION Decision To Admit 07/29/2022 12:11:43 AM      Condition on Disposition    Improved    PATIENT REFERRED TO:  No follow-up provider specified.     DISCHARGE MEDICATIONS:  New Prescriptions    No medications on file       (Please note that portions of this note were completed with a voice recognition program.  Efforts were made to edit the dictations but occasionally words are mis-transcribed.)    Preston De La Rosa DO,   Attending Emergency Physician         Preston De La Rosa DO  07/29/22 [address]

## 2022-07-29 NOTE — H&P
[facility]  Office: [address], DO, Marcelino Delgadoe, DO, Trudy Ojeda, DO, Latosha Bowen, DO, Haroldo Leo MD, Viktor Monge MD, Carlos Higuera MD, Corina Oconnor MD,  Torrie Levy MD, Magda Perdomo MD, Abdias Hines, DO, Scarlet Rosas MD,  Abe Mohs, MD, Katerina Bowman MD, Christian Turner, DO, Ban Richardson MD, Hardik Jenkins MD, Boby Li MD, Inman Officer, DO, Jia Carolina MD, Debbie Ramirez MD, Tammy Gonzalez, CNP,  Ayaz Infante, CNP, Gearline Precise, CNP, Becka Gutierres, CNP, Rao Millard PA-C, Carmen Barth, [facility], Vik Hawkins, CNP, Sobeida Velez, CNP, Dede Cordero, CNP, Sly Christensen, CNP, Chichi Childers, CNP, Sammy Lima, CNS, Cody Anne, [facility], Willis Hsu, CNP, Nabila Broussard, CNP, Elizabeth Hernandez, [facility]   [address]    HISTORY AND PHYSICAL EXAMINATION            Date:   7/29/2022  Patient name:  Tatyana José  Date of admission:  7/28/2022  9:19 PM  MRN:   [id number]  Account:  [de-identified]  YOB: 1935  PCP:    Tyler Bunch DO  Room:   [address] [place] [address]  Code Status:    Full Code    Chief Complaint:     Chief Complaint   Patient presents with    Altered Mental Status     Brought by squad- altered mental status d/t untreated UTI -pt is refusing abx      History Obtained From:     patient, electronic medical record    History of Present Illness:     Tatyana José is a 80 y.o. female with a past medical history of colon cancer (s/p partial colectomy), DVT (s/p Bela filter placement; on Eliquis) and HLD who presented to the emergency department on 7/29/2022 after being found to be altered at home. According to chart review, the patient was diagnosed with a urinary tract infection outpatient but was refusing to take antibiotics for unclear reasons. In the ED, the patient was afebrile and nontoxic appearing but was altered and confused.  CT scan never been exposed to tobacco smoke. She has never used smokeless tobacco.  Alcohol:      reports that she does not currently use alcohol. Drug Use:  has no history on file for drug use. Family History:     History reviewed. No pertinent family history. Review of Systems:     Unable to obtain ROS due to altered mental status. Physical Exam:   /79   Pulse 87   Temp 97.4 °F (36.3 °C) (Oral)   Resp 16   Wt 108 lb 11 oz (49.3 kg)   SpO2 100%   Temp (24hrs), Av.6 °F (36.4 °C), Min:97.4 °F (36.3 °C), Max:97.7 °F (36.5 °C)    No results for input(s): POCGLU in the last 72 hours. Intake/Output Summary (Last 24 hours) at 2022 0958  Last data filed at 2022 2307  Gross per 24 hour   Intake 999 ml   Output --   Net 999 ml       General Appearance:  Frail and chronically ill-appearing. Mental status: Alert and oriented x 0. Head:  normocephalic, atraumatic  Eye: no icterus, redness, pupils equal and reactive, extraocular eye movements intact, conjunctiva clear  Ear: normal external ear, no discharge, hearing intact  Nose:  no drainage noted  Mouth: mucous membranes moist  Neck: supple, no carotid bruits, thyroid not palpable  Lungs: Bilateral equal air entry, clear to ausculation, no wheezing, rales or rhonchi, normal effort  Cardiovascular: normal rate, regular rhythm, no murmur, gallop, rub  Abdomen: Soft, nontender, nondistended. Surgical scar appreciated.    Neurologic: There are no new focal motor or sensory deficits, normal muscle tone and bulk, no abnormal sensation, normal speech, cranial nerves II through XII grossly intact  Skin: No gross lesions, rashes, bruising or bleeding on exposed skin area  Extremities:  peripheral pulses palpable, no pedal edema or calf pain with palpation  Psych: Anxious affect     Investigations:      Laboratory Testing:  Recent Results (from the past 24 hour(s))   Lactate, Sepsis    Collection Time: 22  9:43 PM   Result Value Ref Range Lactic Acid, Sepsis 0.9 0.5 - 1.9 mmol/L   Comprehensive Metabolic Panel    Collection Time: 07/28/22  9:43 PM   Result Value Ref Range    Glucose 98 70 - 99 mg/dL    BUN 19 8 - 23 mg/dL    Creatinine 1.07 (H) 0.50 - 0.90 mg/dL    Calcium 10.2 8.6 - 10.4 mg/dL    Sodium 144 135 - 144 mmol/L    Potassium 3.3 (L) 3.7 - 5.3 mmol/L    Chloride 105 98 - 107 mmol/L    CO2 28 20 - 31 mmol/L    Anion Gap 11 9 - 17 mmol/L    Alkaline Phosphatase 85 35 - 104 U/L    ALT 10 5 - 33 U/L    AST 24 <32 U/L    Total Bilirubin 0.60 0.3 - 1.2 mg/dL    Total Protein 5.6 (L) 6.4 - 8.3 g/dL    Albumin 3.4 (L) 3.5 - 5.2 g/dL    Albumin/Globulin Ratio 1.5 1.0 - 2.5    GFR Non- 49 (L) >60 mL/min    GFR  59 (L) >60 mL/min    GFR Comment         CBC with Auto Differential    Collection Time: 07/28/22  9:43 PM   Result Value Ref Range    WBC 4.8 3.5 - 11.0 k/uL    RBC 3.08 (L) 4.0 - 5.2 m/uL    Hemoglobin 9.0 (L) 12.0 - 16.0 g/dL    Hematocrit 26.9 (L) 36 - 46 %    MCV 87.4 80 - 100 fL    MCH 29.4 26 - 34 pg    MCHC 33.6 31 - 37 g/dL    RDW 19.2 (H) 12.5 - 15.4 %    Platelets 088 868 - 861 k/uL    MPV 9.1 6.0 - 12.0 fL    Seg Neutrophils 48 36 - 66 %    Lymphocytes 36 24 - 44 %    Monocytes 12 (H) 2 - 11 %    Eosinophils % 4 1 - 4 %    Basophils 0 0 - 2 %    Segs Absolute 2.30 1.8 - 7.7 k/uL    Absolute Lymph # 1.70 1.0 - 4.8 k/uL    Absolute Mono # 0.60 0.1 - 1.2 k/uL    Absolute Eos # 0.20 0.0 - 0.4 k/uL    Basophils Absolute 0.00 0.0 - 0.2 k/uL   Magnesium    Collection Time: 07/28/22  9:43 PM   Result Value Ref Range    Magnesium 1.4 (L) 1.6 - 2.6 mg/dL   Lipase    Collection Time: 07/28/22  9:43 PM   Result Value Ref Range    Lipase 48 13 - 60 U/L   Protime-INR    Collection Time: 07/28/22  9:43 PM   Result Value Ref Range    Protime 10.2 9.4 - 12.6 sec    INR 1.0    APTT    Collection Time: 07/28/22  9:43 PM   Result Value Ref Range    PTT 23.2 21.3 - 31.3 sec   Troponin    Collection Time: 07/28/22  9:43 PM   Result Value Ref Range    Troponin, High Sensitivity 85 (HH) 0 - 14 ng/L   Urinalysis with Reflex to Culture    Collection Time: 07/28/22 10:48 PM    Specimen: Urine, clean catch   Result Value Ref Range    Color, UA Yellow Yellow    Turbidity UA Cloudy (A) Clear    Glucose, Ur NEGATIVE NEGATIVE    Bilirubin Urine NEGATIVE  Verified by ictotest. (A) NEGATIVE    Ketones, Urine SMALL (A) NEGATIVE    Specific Gravity, UA 1.021 1.005 - 1.030    Urine Hgb NEGATIVE NEGATIVE    pH, UA 6.0 5.0 - 8.0    Protein, UA 1+ (A) NEGATIVE    Urobilinogen, Urine Normal Normal    Nitrite, Urine NEGATIVE NEGATIVE    Leukocyte Esterase, Urine NEGATIVE NEGATIVE   Microscopic Urinalysis    Collection Time: 07/28/22 10:48 PM   Result Value Ref Range    -          WBC, UA 5 TO 10 0 - 5 /HPF    RBC, UA 0 TO 2 0 - 2 /HPF    Casts UA 5 TO 10 /LPF    Casts UA HYALINE /LPF    Epithelial Cells UA TOO NUMEROUS TO COUNT 0 - 5 /HPF    Bacteria, UA MANY (A) None    Other Observations UA (A) NOT REQ. Utilizing a urinalysis as the only screening method to exclude a potential uropathogen can be unreliable in many patient populations. Rapid screening tests are less sensitive than culture and if UTI is a clinical possibility, culture should be considered despite a negative urinalysis.      Troponin    Collection Time: 07/28/22 11:43 PM   Result Value Ref Range    Troponin, High Sensitivity 90 (HH) 0 - 14 ng/L   Basic Metabolic Panel w/ Reflex to MG    Collection Time: 07/29/22  5:45 AM   Result Value Ref Range    Glucose 87 70 - 99 mg/dL    BUN 16 8 - 23 mg/dL    Creatinine 0.97 (H) 0.50 - 0.90 mg/dL    Calcium 9.4 8.6 - 10.4 mg/dL    Sodium 143 135 - 144 mmol/L    Potassium 3.2 (L) 3.7 - 5.3 mmol/L    Chloride 107 98 - 107 mmol/L    CO2 25 20 - 31 mmol/L    Anion Gap 11 9 - 17 mmol/L    GFR Non-African American 54 (L) >60 mL/min    GFR African American >60 >60 mL/min    GFR Comment         Protime-INR    Collection Time: 07/29/22  5:45 AM Result Value Ref Range    Protime 11.4 9.4 - 12.6 sec    INR 1.1    CBC with Auto Differential    Collection Time: 07/29/22  5:45 AM   Result Value Ref Range    WBC 4.4 3.5 - 11.0 k/uL    RBC 2.62 (L) 4.0 - 5.2 m/uL    Hemoglobin 7.7 (L) 12.0 - 16.0 g/dL    Hematocrit 23.3 (L) 36 - 46 %    MCV 89.0 80 - 100 fL    MCH 29.4 26 - 34 pg    MCHC 33.1 31 - 37 g/dL    RDW 19.1 (H) 12.5 - 15.4 %    Platelets 106 237 - 509 k/uL    MPV 8.4 6.0 - 12.0 fL    Seg Neutrophils 63 36 - 66 %    Lymphocytes 24 24 - 44 %    Monocytes 9 2 - 11 %    Eosinophils % 3 1 - 4 %    Basophils 1 0 - 2 %    Segs Absolute 2.80 1.8 - 7.7 k/uL    Absolute Lymph # 1.00 1.0 - 4.8 k/uL    Absolute Mono # 0.40 0.1 - 1.2 k/uL    Absolute Eos # 0.10 0.0 - 0.4 k/uL    Basophils Absolute 0.00 0.0 - 0.2 k/uL   Magnesium    Collection Time: 07/29/22  5:45 AM   Result Value Ref Range    Magnesium 1.2 (L) 1.6 - 2.6 mg/dL       Imaging/Diagnostics:  CT ABDOMEN PELVIS WO CONTRAST Additional Contrast? None    Result Date: 7/29/2022  1. No acute infective or inflammatory process. 2. Bilateral small pleural effusion subsegmental atelectasis more pronounced on the left. 3. A 3.3 cm gallstone. No evidence for cholecystitis. 4. Sigmoid diverticulosis. 5. No bowel obstruction. CT HEAD WO CONTRAST    Result Date: 7/28/2022  No acute intracranial abnormality. XR CHEST PORTABLE    Result Date: 7/28/2022  Minor left basilar atelectasis and or pleural effusion. No other acute abnormality evident. CTA HEAD NECK W CONTRAST    Result Date: 7/29/2022  No evidence of stenosis or plaques in the bilateral common carotid arteries, bilateral internal carotid arteries and bilateral vertebral arteries, neck. No evidence of any vascular compromise in the bilateral intracranial cerebral arteries of the anterior and posterior circulations. Multiple nonenhancing hypodense lesions in the thyroid gland, most likely due to multiple cysts.        Assessment :      Hospital Problems             Last Modified POA    * (Principal) Change in mental status 7/29/2022 Yes    Overlapping malignant neoplasm of colon ([facility] [address] 7/29/2022 Yes    Hypertension 7/29/2022 Yes    Hyperlipidemia 7/29/2022 Yes    DVT (deep venous thrombosis) ([facility] [address] 7/29/2022 Yes    Toxic encephalopathy 7/29/2022 Yes       Plan:     Patient status inpatient in the Med/Surg unit. Acute toxic metabolic encephalopathy   -Likely secondary to urinary tract infection   -Ceftriaxone 1 gram q24h   -Urine culture pending   -Maintenance fluids at 75 mL/hr   -CT and CTA head and neck negative for an acute intracranial abnormality   -CT abdomen and pelvis negative for acute intra-abdominal process   -CXR showing minor left basilar atelectasis with no evidence of infectious process   -Daily CBC   -Daily BMP     Elevated troponin   -Likely secondary to demand ischemia   -Continue to trend   -Echocardiogram pending   -Consult cardiology; appreciate recommendations     History of DVT   -Bela filter in place   -Continue home Eliquis     Hx of colon cancer   -S/P partial colectomy   -Follow-up with oncology outpatient as scheduled     HLD   -Continue home rosuvastatin     Consultations:   None    Patient is admitted as inpatient status because of co-morbidities listed above, severity of signs and symptoms as outlined, requirement for current medical therapies and most importantly because of direct risk to patient if care not provided in a hospital setting. Expected length of stay > 48 hours.     Gilma Dominguez MD  7/29/2022  9:58 AM    Copy sent to Dr. Guido Kelley DO

## 2022-07-29 NOTE — PROGRESS NOTES
Physical Therapy  Facility/Department: [facility] SURG ICU  Physical Therapy Initial Assessment    Name: Denisse Aguilera  : 1935  MRN: [id number]  Date of Service: 2022  Chief Complaint   Patient presents with    Altered Mental Status     Brought by squad- altered mental status d/t untreated UTI -pt is refusing abx         Discharge Recommendations:  Patient would benefit from continued therapy after discharge, Continue to assess pending progress   PT Equipment Recommendations  Equipment Needed: No      Patient Diagnosis(es): The primary encounter diagnosis was Urinary tract infection with hematuria, site unspecified. Diagnoses of Altered mental status, unspecified altered mental status type and Dehydration were also pertinent to this visit. Past Medical History:  has a past medical history of Anemia, Atrial fibrillation ([address] Cognitive communication deficit, Colon cancer ([address] Deep venous thrombosis of lower extremity ([address] GI bleed, Hyperlipidemia, Malnutrition ([address] Muscle weakness, and Rhabdomyolysis. Past Surgical History:  has a past surgical history that includes Colon surgery. Assessment   Body Structures, Functions, Activity Limitations Requiring Skilled Therapeutic Intervention: Decreased functional mobility ; Decreased endurance;Decreased safe awareness;Decreased strength;Decreased balance;Decreased cognition  Assessment: Pt presents with generalized weakness and impaired mobility at this time due to confusion and decreased tolerance for activity. Pt requires Max assist x 2 for bed mobillity and transfers; pt unable to ambulate at this time. Pt requires Max verbal cueing for initiation of tasks and follow through. Plans at this time are for patient to return to SNF upon discharge.   Treatment Diagnosis: dec mobility  Therapy Prognosis: Fair  Decision Making: Medium Complexity  Requires PT Follow-Up: Yes  Activity Tolerance  Activity Tolerance: Patient limited by fatigue;Patient limited by endurance;Treatment limited secondary to decreased cognition     Plan   Plan  Plan:  (5-6x/week)  Current Treatment Recommendations: Strengthening, Balance training, Functional mobility training, Transfer training, Gait training, Endurance training, Patient/Caregiver education & training, Safety education & training, Therapeutic activities  Safety Devices  Type of Devices: Bed alarm in place, Call light within reach, Gait belt, Patient at risk for falls, Left in bed, Nurse notified  Restraints  Restraints Initially in Place: No     Restrictions  Restrictions/Precautions  Restrictions/Precautions: Fall Risk  Required Braces or Orthoses?: No  Position Activity Restriction  Other position/activity restrictions: Up with assist     Subjective   General  Patient assessed for rehabilitation services?: Yes  Family / Caregiver Present: Yes  Referring Practitioner: Gilma Starr  Referral Date : 07/29/22  Diagnosis: Altered mental status  Follows Commands: Impaired  Subjective  Subjective: Pt supine in bed with daughter present. Pt and nursing agreeable to therapy. Social/Functional History  Social/Functional History  Lives With: Alone  Type of Home: [facility]  Home Layout: One level  Home Access: Stairs to enter with rails  Entrance Stairs - Number of Steps: 2  Entrance Stairs - Rails:  (has handhold/grab bar)  Home Equipment: Elverna Corti, rolling, Walker, 4 wheeled  Receives Help From: Family  ADL Assistance: Independent  Homemaking Assistance: Independent  Ambulation Assistance: Independent  Transfer Assistance: Independent  Active : No  Patient's  Info: has been several months since she has driven; family transports  Occupation: Retired  Additional Comments: Pt had colon CA surgery in June and has not been home since. Pt has been at [facility] for rehab and plans to return there at discharge.   Vision/Hearing  Vision  Vision: Impaired  Vision Exceptions: Wears glasses for reading  Hearing  Hearing: Within functional limits    Cognition   Orientation  Overall Orientation Status: Impaired  Orientation Level: Oriented to person;Disoriented to place; Disoriented to time  Cognition  Overall Cognitive Status: Exceptions  Arousal/Alertness: Delayed responses to stimuli  Following Commands: Follows one step commands with increased time  Attention Span: Attends with cues to redirect; Difficulty attending to directions  Safety Judgement: Decreased awareness of need for assistance;Decreased awareness of need for safety  Insights: Decreased awareness of deficits  Initiation: Requires cues for all  Sequencing: Requires cues for all     Objective        Gross Assessment  AROM: Within functional limits  PROM: Within functional limits  Strength: Generally decreased, functional  Coordination: Generally decreased, functional  Tone: Normal  Sensation: Intact     AROM RLE (degrees)  RLE AROM: WFL  AROM LLE (degrees)  LLE AROM : WFL  Strength RLE  Comment: Unable to MMT due to cognition. AROM noted throughout. Strength LLE  Comment: Unable to MMT due to cognition. AROM noted throughout. Bed Mobility Training  Bed Mobility Training: Yes  Overall Level of Assistance: Assist X2;Maximum assistance  Interventions: Verbal cues (initiation)  Supine to Sit: Maximum assistance;Assist X2;Additional time; Adaptive equipment (Pt performed supine>sit with max Ax2 with HOB raised ~40*. Pt with difficulty intitating task.)  Sit to Supine: Maximum assistance; Adaptive equipment; Additional time;Assist X2 (Pt performed sit>supine with max Ax2 with HOB raised ~40*.  Pt with difficulty intitating task.)  Balance  Sitting: Intact (Pt seated EOB unsupported for ~12min both statically and dynamically.)  Standing: Impaired (Pt stood EOB ~45-60s with max Ax2 with [place] for balance.)  Transfer Training  Transfer Training: Yes  Overall Level of Assistance: Maximum assistance;Assist X2;Additional time  Interventions: Verbal cues (Initiation)  Sit to Stand: Maximum assistance; Additional time;Assist X2 (Pt performed sit>stand from EOB with max Ax2 with [place] to perform. Pt with additional time to complete due to difficulty initiating task.)  Stand to Sit: Additional time;Maximum assistance;Assist X2 (Pt performed stand>sit wth max Ax2 with [place] onto EOB. Pt returned to sitting after asking to sit.)  Gait  Overall Level of Assistance:  (Further OOB activity not attempted due to poor standing balance.)  Bed mobility  Supine to Sit: Maximum assistance;2 Person assistance  Sit to Supine: Maximum assistance;2 Person assistance  Scooting: Maximal assistance  Bed Mobility Comments: Head of bed elevated 50 degrees. Verbal and tactile cues needed for task initiation and follow through. Increased time and effort. Transfers  Sit to Stand: Maximum Assistance;2 Person Assistance  Stand to sit: Maximum Assistance;2 Person Assistance  Comment: Transfers with Max assist x 2 side by side without device. Pt stood edge of bed x 30 seconds with Mod-Max assist to maintain standing balance. Ambulation  Surface:  (Pt was not able to ambulate.)     Balance  Posture: Good  Sitting - Static: Fair;+  Standing - Static: Poor  Standing - Dynamic: Poor  Comments: balance assessed without device; pt unable to maintain standing balance without Mod-Max assist x 2. OutComes Score                                                  AM-PAC Score  AM-PAC Inpatient Mobility Raw Score : 9 (07/29/22 1713)  AM-PAC Inpatient T-Scale Score : 30.55 (07/29/22 1713)  Mobility Inpatient CMS 0-100% Score: 81.38 (07/29/22 1713)  Mobility Inpatient CMS G-Code Modifier : CM (07/29/22 1713)          [place] Score       Goals  Short Term Goals  Time Frame for Short term goals: 14 visits  Short term goal 1: Modified (I) bed mobility. Short term goal 2: Pt to transfer with Modified (I)-Supervision without LOB and device as needed.   Short term goal 3: Pt to ambulate with RW 80' with Modified (I)-Supervision without LOB.  Short term goal 4: Pt to tolerate 30 minutes of therapy activity to improve endurance for mobility. Patient Goals   Patient goals : None stated       Education  Patient Education  Education Given To: Patient; Family  Education Provided: Role of Therapy;Plan of Care;Transfer Training  Education Provided Comments: Importance of mobility; safety awarenes  Education Method: Demonstration;Verbal  Barriers to Learning: Cognition  Education Outcome: Continued education needed      Therapy Time   Individual Concurrent Group Co-treatment   Time In  1353         Time Out  1430         Minutes  37             Timed code treatment minutes: [address], PT

## 2022-07-29 NOTE — ED NOTES
Depends saturated with urine, pericare done, turned and repositioned pt on left side, call light in hand, will continue to monitor.       Philip Quiñones RN  07/29/22 6184

## 2022-07-30 LAB
AMMONIA: 21 UMOL/L (ref 11–51)
EKG ATRIAL RATE: 85 BPM
EKG P AXIS: 64 DEGREES
EKG P-R INTERVAL: 164 MS
EKG Q-T INTERVAL: 374 MS
EKG QRS DURATION: 76 MS
EKG QTC CALCULATION (BAZETT): 445 MS
EKG R AXIS: 70 DEGREES
EKG T AXIS: 0 DEGREES
EKG VENTRICULAR RATE: 85 BPM
PARTIAL THROMBOPLASTIN TIME: 102.2 SEC (ref 21.3–31.3)
PARTIAL THROMBOPLASTIN TIME: 118.9 SEC (ref 21.3–31.3)
PARTIAL THROMBOPLASTIN TIME: 40.6 SEC (ref 21.3–31.3)
TROPONIN, HIGH SENSITIVITY: 99 NG/L (ref 0–14)
TSH SERPL DL<=0.05 MIU/L-ACNC: 2.13 UIU/ML (ref 0.3–5)

## 2022-07-30 PROCEDURE — 87186 SC STD MICRODIL/AGAR DIL: CPT

## 2022-07-30 PROCEDURE — 85730 THROMBOPLASTIN TIME PARTIAL: CPT

## 2022-07-30 PROCEDURE — 6360000002 HC RX W HCPCS: Performed by: STUDENT IN AN ORGANIZED HEALTH CARE EDUCATION/TRAINING PROGRAM

## 2022-07-30 PROCEDURE — 84443 ASSAY THYROID STIM HORMONE: CPT

## 2022-07-30 PROCEDURE — 92610 EVALUATE SWALLOWING FUNCTION: CPT

## 2022-07-30 PROCEDURE — 2580000003 HC RX 258: Performed by: CLINICAL NURSE SPECIALIST

## 2022-07-30 PROCEDURE — 1210000000 HC MED SURG R&B

## 2022-07-30 PROCEDURE — 97530 THERAPEUTIC ACTIVITIES: CPT

## 2022-07-30 PROCEDURE — 36415 COLL VENOUS BLD VENIPUNCTURE: CPT

## 2022-07-30 PROCEDURE — 99232 SBSQ HOSP IP/OBS MODERATE 35: CPT | Performed by: STUDENT IN AN ORGANIZED HEALTH CARE EDUCATION/TRAINING PROGRAM

## 2022-07-30 PROCEDURE — 97110 THERAPEUTIC EXERCISES: CPT

## 2022-07-30 PROCEDURE — 2580000003 HC RX 258: Performed by: STUDENT IN AN ORGANIZED HEALTH CARE EDUCATION/TRAINING PROGRAM

## 2022-07-30 PROCEDURE — 97535 SELF CARE MNGMENT TRAINING: CPT

## 2022-07-30 PROCEDURE — 87086 URINE CULTURE/COLONY COUNT: CPT

## 2022-07-30 PROCEDURE — 87077 CULTURE AEROBIC IDENTIFY: CPT

## 2022-07-30 PROCEDURE — 82140 ASSAY OF AMMONIA: CPT

## 2022-07-30 RX ADMIN — SODIUM CHLORIDE: 9 INJECTION, SOLUTION INTRAVENOUS at 04:21

## 2022-07-30 RX ADMIN — HEPARIN SODIUM AND DEXTROSE 12 UNITS/KG/HR: 10000; 5 INJECTION INTRAVENOUS at 10:19

## 2022-07-30 RX ADMIN — HEPARIN SODIUM 1970 UNITS: 1000 INJECTION INTRAVENOUS; SUBCUTANEOUS at 18:21

## 2022-07-30 RX ADMIN — HEPARIN SODIUM AND DEXTROSE 14 UNITS/KG/HR: 10000; 5 INJECTION INTRAVENOUS at 18:24

## 2022-07-30 RX ADMIN — SODIUM CHLORIDE: 9 INJECTION, SOLUTION INTRAVENOUS at 18:17

## 2022-07-30 RX ADMIN — CEFTRIAXONE SODIUM 1000 MG: 1 INJECTION, POWDER, FOR SOLUTION INTRAMUSCULAR; INTRAVENOUS at 13:59

## 2022-07-30 NOTE — CONSULTS
[facility]   Consult Note                 Date:   7/30/2022  Date of admission:  7/28/2022  9:19 PM  MRN:   [id number]  YOB: 1935    Reason for Consult:      HISTORY OF PRESENT ILLNESS:    The patient is 80years old  female who is admitted to the hospital for altered mental status. Patient to have a history of CVA: Status post colectomy, history of DVT status post Bela filter  Cardiology called because of the elevated tropes. [id number]. Patient is pleasantly confused . but at this time she knows she is in the hospital.  Patient denies any chest pains, patient is denying no to all of the questions. Patient treated with UTI with possible encephalopathy. Rest of the history and physical taken from the medical record and talking to the medical team  Patient initial hemoglobin was 9 this morning is 8.2    Past Medical History:   has a past medical history of Anemia, Atrial fibrillation ([address] Cognitive communication deficit, Colon cancer ([address] Deep venous thrombosis of lower extremity ([address] GI bleed, Hyperlipidemia, Malnutrition ([address] Muscle weakness, and Rhabdomyolysis. Past Surgical History:   has a past surgical history that includes Colon surgery. Home Medications:    Prior to Admission medications    Medication Sig Start Date End Date Taking? Authorizing Provider   apixaban (ELIQUIS) 5 MG TABS tablet Take 5 mg by mouth in the morning and 5 mg before bedtime. Yes Historical Provider, MD   nitrofurantoin, macrocrystal-monohydrate, (MACROBID) 100 MG capsule Take 100 mg by mouth in the morning and 100 mg before bedtime. Yes Historical Provider, MD   magnesium oxide (MAG-OX) 400 MG tablet Take 400 mg by mouth in the morning. Yes Historical Provider, MD   omeprazole (PRILOSEC) 20 MG delayed release capsule Take 20 mg by mouth in the morning and 20 mg in the evening.    Yes Historical Provider, MD   ondansetron (ZOFRAN) 4 MG tablet Take 4 mg by mouth every 8 hours as needed for Nausea or Vomiting   Yes Historical Provider, MD   Rosuvastatin Calcium 10 MG CPSP Take by mouth daily   Yes Historical Provider, MD   acetaminophen (TYLENOL) 325 MG tablet Take 650 mg by mouth every 6 hours as needed for Pain   Yes Historical Provider, MD       Current Medications: Scheduled Meds:   magnesium oxide  400 mg Oral Daily    pantoprazole  40 mg Oral BID AC    sodium chloride flush  5-40 mL IntraVENous 2 times per day    cefTRIAXone (ROCEPHIN) IV  1,000 mg IntraVENous Q24H    rosuvastatin  10 mg Oral Nightly     Continuous Infusions:   sodium chloride      sodium chloride 75 mL/hr at 07/30/22 0421    heparin (PORCINE) Infusion 15 Units/kg/hr (07/30/22 0130)     PRN Meds:.ondansetron, sodium chloride flush, sodium chloride, potassium chloride **OR** potassium alternative oral replacement **OR** potassium chloride, magnesium sulfate, polyethylene glycol, acetaminophen **OR** acetaminophen, heparin (porcine), heparin (porcine)     Allergies:  Patient has no known allergies. Social History:   reports that she has never smoked. She has never been exposed to tobacco smoke. She has never used smokeless tobacco. She reports that she does not currently use alcohol. Family History: Family history noncontributory    Review of Systems difficult to get  CONSTITUTIONAL:     EYES:     HEENT:     RESPIRATORY:     CARDIOVASCULAR:     GASTROINTESTINAL:     GENITOURINARY:     MUSCULOSKELETAL:     NEUROLOGICAL:     PSYCHIATRIC:                PHYSICAL EXAM:    Blood pressure (!) 147/68, pulse 79, temperature 97.8 °F (36.6 °C), temperature source Oral, resp. rate 18, height 5' 3\" (1.6 m), weight 120 lb 13 oz (54.8 kg), SpO2 98 %.     CONSTITUTIONAL: AOx1, no apparent distress, appears stated age   HEAD: normocephalic, atraumatic   EYES: PERRLA, EOMI   ENT: moist mucous membranes, uvula midline   NECK:  symmetric, no midline tenderness to palpation   LUNGS: clear to auscultation bilaterally CARDIOVASCULAR: regular rate and rhythm, no murmurs, rubs or gallops   ABDOMEN: Soft, non-tender, non-distended with normal active bowel sounds   SKIN: no rash       DATA:    ECG: Normal sinus rhythm nonspecific T changes QTc is 445 ms  Echo:7/29/22  A limited echocardiogram was performed. Left ventricle is normal in size and mild left ventricular hypertrophy. Global left ventricular systolic function is hyperdynamic with an estimated ejection fraction of >65% No obvious wall motion abnormalities. Small pericardial effusion with No signs of cardiac tamponade, no RA or RV wall collapse. Stress:  Cath:    Labs:     CBC:   Recent Labs     07/29/22  0545 07/29/22  1614   WBC 4.4 3.9   HGB 7.7* 8.2*   HCT 23.3* 24.5*    308     BMP:   Recent Labs     07/28/22 2143 07/29/22  0545    143   K 3.3* 3.2*   CO2 28 25   BUN 19 16   CREATININE 1.07* 0.97*   LABGLOM 49* 54*   GLUCOSE 98 87     BNP: No results for input(s): BNP in the last 72 hours. PT/INR:   Recent Labs     07/28/22 2143 07/29/22  0545   PROTIME 10.2 11.4   INR 1.0 1.1     APTT:  Recent Labs     07/29/22  1614 07/29/22  2323   APTT 22.1 118.9*     CARDIAC ENZYMES:No results for input(s): CKTOTAL, CKMB, CKMBINDEX, TROPONINI in the last 72 hours.   FASTING LIPID PANEL:No results found for: HDL, LDLDIRECT, LDLCALC, TRIG  LIVER PROFILE:  Recent Labs     07/28/22  2143   AST 24   ALT 10   LABALBU 3.4*       Intake/Output Summary (Last 24 hours) at 7/30/2022 7671  Last data filed at 7/30/2022 0719  Gross per 24 hour   Intake 2339.38 ml   Output 650 ml   Net 1689.38 ml       IMPRESSION:    Patient Active Problem List   Diagnosis    Change in mental status    Overlapping malignant neoplasm of colon ([facility]    Hypertension    Hyperlipidemia    DVT (deep venous thrombosis) (HCC)    Toxic encephalopathy     * Elevated Trop         RECOMMENDATIONS:  Elevated tropes, most likely type II given sepsis, no significant EKG changes, no chest pains  Echocardiogram normal ejection fraction no wall motion abnormality mild LVH  Hypertension fairly controlled continue current medications  UTI being treated  Continue rest  No further cardiac work-up at this time  Patient may need ischemia work-up as an outpatient once current condition improved      Discussed with  nursing.     Juan C Valdez MD, MD  [facility]        [phone]

## 2022-07-30 NOTE — PROGRESS NOTES
Occupational Therapy  Facility/Department: [facility] ICU  Daily Treatment Note  NAME: Roopa Lopez  : 1935  MRN: [id number]    Date of Service: 2022    Discharge Recommendations:  Patient would benefit from continued therapy after discharge      Patient Diagnosis(es): The primary encounter diagnosis was Urinary tract infection with hematuria, site unspecified. Diagnoses of Altered mental status, unspecified altered mental status type and Dehydration were also pertinent to this visit. Assessment    Performance deficits / Impairments: Decreased functional mobility ; Decreased endurance;Decreased coordination;Decreased ADL status; Decreased ROM; Decreased balance;Decreased strength;Decreased high-level IADLs;Decreased safe awareness;Decreased cognition;Decreased fine motor control  Assessment: Pt ability to perform functional tasks significantly limited by deficits listed above, most significantly decreased cognition and decreased activity tolerance. Pt would benefit from continued skilled occupational therapy throughout acute care stay and at discharge for increasing independence and safety during ADL task performance. Based on current level of function, pt requires 24hr assist/supervision at discharge. Prognosis: Good  REQUIRES OT FOLLOW-UP: Yes  Activity Tolerance  Activity Tolerance: Patient limited by pain; Patient limited by fatigue;Patient Tolerated treatment well;Treatment limited secondary to decreased cognition      Patient Education  Education Given To: Patient  Education Provided: Role of Therapy;Plan of Care;Orientation (Activity Promotion, Bed Mobility Techniques, Safety with Transfers, ADL Techniques)  Education Method: Demonstration;Verbal  Barriers to Learning: Cognition  Education Outcome: Continued education needed  Safety Devices  Type of Devices: Bed alarm in place;Call light within reach; Left in bed;Nurse notified  Restraints  Restraints Initially in Place: No Pt with improved awareness to environment this session. able to answer questions about family today with improved accuracy. Bed Mobility Training  Bed Mobility Training: Yes  Interventions: Verbal cues;tactile cues (max verbal/tactile cues for initiation/sequencing/attention to task throughout)  Supine to Sit: Maximum assistance;Assist X2 (HOB raised ~40*)  Sit to Supine: Maximum assistance;Assist X2  Scooting: Maximum assistance;Assist X2  Comments: Increased time/effort to perform. Balance  Sitting: Minimal assistance (pt seated EOB ~5 minutes with BUE support on EOB/bed rail and min A due to mild posterior lean; pt seated upright supported at [facility] and at wheelchair ~35 minutes total throughout session with stand by assistance required for safety)  Standing: Maximum assistance (x1-2, face to face assistance; pt demo ~10-30 second bouts of standing during static standing for ADL performance and throughout stand pivot transfers)    Transfer Training  Transfer Training: Yes  Interventions: Verbal cues; Tactile cues; Safety awareness training (max verbal/tactile cues for initiation/sequencing/attention to task/safety awareness throughout)  Sit to Stand: Maximum assistance;Assist X2  Stand to Sit: Maximum assistance;Assist X2  Stand Pivot Transfers: Maximum assistance;Assist X2 (face-to-face pivots from bed > BSC > wheelchair > bed)  Comments: Increased time/effort to perform; pt unsteady with posterior lean and poor ability to reach a fully upright position/maintain an upright position throughout functional transfers. ADL  Grooming: Maximum assistance;Verbal cueing; Increased time to complete (pt seated supported in wheelchair at sink side to perform oral care, facial hygiene, and comb hair)  Grooming Skilled Clinical Factors: setup of all items on countertop; max verbal/tactile cues for initiation/sequencing/problem solving and attention to task; pt able to comb hair with mod A at R elbow to support RUE against gravity however pt fatigues quickly and progresses to max A for oral care/facial hygiene- hand over hand max A for opening toothpaste/to apply to toothbrush, hand over hand max A to bring toothbrush and washcloth to face, pt able to grasp toothbrush/washcloth while writer provide max A at R elbow to support RUE against gravity)  LE Dressing: Maximum assistance;Verbal cueing; Increased time to complete (to don socks while supine in bed- pt able to assist with lifting/positioning BLE while writer places socks)  Toileting: Maximum assistance; Increased time to complete; Adaptive equipment (pt reporting need to void at therapist arrival and agreeable to transfer stand pivot transfer to/from [facility] with max A, max A x2 throughout pericare/bottom hygiene and brief mngt as assist x1 for standing balance and assist x1 for hygiene/brief mngt tasks)        AM-PAC Score  AM-[facility] Inpatient Daily Activity Raw Score: 12 (07/29/22 1530)  AM-PAC Inpatient ADL T-Scale Score : 30.6 (07/29/22 1530)  ADL Inpatient CMS 0-100% Score: 66.57 (07/29/22 1530)  ADL Inpatient CMS G-Code Modifier : CL (07/29/22 1530)    Goals  Short Term Goals  Time Frame for Short term goals: by discharge  Short Term Goal 1: pt will tolerate 3+min of standing during functional task performance with use of least restrictive AD  Short Term Goal 2: pt will perform UB ADLs with min A and min VC with use of least restrictive AE/DME  Short Term Goal 3: pt will perform LB ADLs with mod A and min VC with use of least restrictive AE/DME  Short Term Goal 4: pt will perform functional transfer/mobility with min A and use of least restrictive AD  Short Term Goal 5: pt will perform table top activity in sitting unsupported for 15+min with min VC       Therapy Time   Individual Concurrent Group Co-treatment   Time In 0841         Time Out 0928         Minutes 47         Timed Code Treatment Minutes: 23 Minutes       Kendra Abreu OTR/L

## 2022-07-30 NOTE — PROGRESS NOTES
Physical Therapy  Facility/Department: [facility]  Daily Treatment Note  NAME: Serg Cano  : 1935  MRN: [id number]  Chief Complaint   Patient presents with    Altered Mental Status     Brought by squad- altered mental status d/t untreated UTI -pt is refusing abx       Date of Service: 2022    Discharge Recommendations:  Patient would benefit from continued therapy after discharge   PT Equipment Recommendations  Equipment Needed: No    Patient Diagnosis(es): The primary encounter diagnosis was Urinary tract infection with hematuria, site unspecified. Diagnoses of Altered mental status, unspecified altered mental status type and Dehydration were also pertinent to this visit. Assessment   Assessment: Pt currently requiring maxA x 2 for all functional mobility secondary to decreased cognition,decreased safety awareness, and decreased functional strength. pt was able to follow simple commands today with increased time. Pt currently requires 24 hour care for all needs and mobility. will benefit from continued PT after discharge. Activity Tolerance: Patient limited by fatigue;Patient limited by endurance;Treatment limited secondary to decreased cognition  Equipment Needed: No     Plan    Plan  Plan:  (5-6x/week)  Current Treatment Recommendations: Strengthening;Balance training;Functional mobility training;Transfer training;Gait training; Endurance training;Patient/Caregiver education & training; Safety education & training; Therapeutic activities     Restrictions  Restrictions/Precautions  Restrictions/Precautions: Fall Risk  Required Braces or Orthoses?: No  Position Activity Restriction  Other position/activity restrictions: Up with assist     Subjective    Subjective  Subjective: pt supine in bed. Pt and nursing agreeable to PT session this AM  Pain: Pt denies pain at this time.   Orientation  Overall Orientation Status: Impaired  Orientation Level: Oriented to person;Disoriented to place; Disoriented to situation;Disoriented to time  Cognition  Overall Cognitive Status: Exceptions  Arousal/Alertness: Delayed responses to stimuli  Following Commands: Follows one step commands with repetition; Follows one step commands with increased time (inconsistently)  Attention Span: Attends with cues to redirect; Difficulty attending to directions; Difficulty dividing attention  Memory: Decreased recall of precautions;Decreased recall of recent events;Decreased short term memory  Safety Judgement: Decreased awareness of need for assistance;Decreased awareness of need for safety  Problem Solving: Assistance required to identify errors made;Assistance required to generate solutions;Assistance required to implement solutions  Insights: Decreased awareness of deficits  Initiation: Requires cues for all  Sequencing: Requires cues for all  Cognition Comment: Pt with improved awareness to environment this session. able to answer questions about family today with improved accuracy. Objective   Vitals     Bed Mobility Training  Bed Mobility Training: Yes  Overall Level of Assistance: Maximum assistance;Assist X2;Additional time  Interventions: Visual cues; Tactile cues  Supine to Sit: Additional time;Maximum assistance;Assist X2 (HOB elevated 30-40 degrees)  Sit to Supine: Maximum assistance;Assist X2;Additional time  Scooting: Maximum assistance;Assist X2;Additional time  Balance  Sitting: With support (pt sitting EOB with support of BUEs.)  Standing: Impaired  Standing - Static: Poor  Standing - Dynamic: Poor  Transfer Training  Transfer Training: Yes  Overall Level of Assistance: Maximum assistance;Assist X2;Additional time  Interventions: Verbal cues; Tactile cues; Safety awareness training  Sit to Stand: Maximum assistance;Assist X2  Stand to Sit: Maximum assistance;Assist X2  Stand Pivot Transfers: Maximum assistance;Assist X2 (face-to-face pivots from bed > BSC > wheelchair > bed)  Toilet Transfer: Maximum assistance;Assist X2;Additional time (bed to bedside commode, HHA of therapist.  with patient attempting to take 1-2 steps going back to bed with verbal cues and max therapist assist.)  Gait Training  Gait Training: No (no ambulation this session secondary to increased BLE weakness and poor static and dynamic standing balance. )     PT Exercises  Exercise Treatment: seated in W/C:  BLE marching x 10, hip abduction x 5, ankle pumps x 10, LAQs x 10 each. required  AAROM with all ex's except LAQs. Safety Devices  Type of Devices: Patient at risk for falls;Nurse notified; Left in bed;Gait belt;Bed alarm in place;Call light within reach  Restraints  Restraints Initially in Place: No       Goals  Short Term Goals  Time Frame for Short term goals: 14 visits  Short term goal 1: Modified (I) bed mobility. Short term goal 2: Pt to transfer with Modified (I)-Supervision without LOB and device as needed. Short term goal 3: Pt to ambulate with RW 80' with Modified (I)-Supervision without LOB. Short term goal 4: Pt to tolerate 30 minutes of therapy activity to improve endurance for mobility. Patient Goals   Patient goals : None stated    Education  Patient Education  Education Given To: Patient  Education Provided: Transfer Training  Education Provided Comments: transfer training,and safety awareness.   Education Method: Demonstration;Verbal  Barriers to Learning: Cognition  Education Outcome: Continued education needed    Therapy Time   Individual Concurrent Group Co-treatment   Time In 0840         Time Out 0928         Minutes 48         25 min PT treatment time       Eric Hightower, PT

## 2022-07-30 NOTE — PROGRESS NOTES
[facility]  Office: [address], DO, Sherly Smith, DO, Anselmone Susana, DO, Pascualtrever Micheal Bowen, DO, Emre Farias MD, Fabi Pat MD, Mariama Bautista MD, Suad Walden MD,  Omid Sandra MD, Valdo Maloney MD, Naheed Curtis, DO, Graciela Kim MD,  Dave Canela MD, Darlyn Rendon MD, Brooke Romero, DO, Rustam Sexton MD, Sal Godinez MD, Luis Fernando Carter MD, Rajni Marcelo, DO, Ajit De Anda MD, Andrea Sepulveda MD, Luana Box, CNP,  Jose Fontenot, CNP, Reza Peter, ALLIE, Dimitri Allred, ALLIE, Juliet Dotson PA-C, Violette Mejía [facility], Santosh Grimaldo, CNP, Blanche Walls, CNP, Maxime Leos, CNP, Jennifer Gunter, CNP, Chrissy Roldan, CNP, Zach Benjamin, CNS, Magen Marshall, [facility], George Blackman, CNP, Barry Short, CNP, Hermila Ortiz, [address] [place] [address]    Progress Note    7/30/2022    8:15 AM    Name:   Luis Eduardo Euceda  MRN:     [id number]     Acct:      [de-identified]   Room:   [address] [place] [address] Day:  1  Admit Date:  7/28/2022  9:19 PM    PCP:   Jia Doran DO  Code Status:  Full Code    Subjective:     C/C:   Chief Complaint   Patient presents with    Altered Mental Status     Brought by squad- altered mental status d/t untreated UTI -pt is refusing abx      Interval History Status: not changed. Patient was seen and examined at bedside this AM. She continues to be encephalopathic and is alert and oriented x 0. Urine culture is pending. The patient has struggled with dysphagia since admission and SLP have been consulted. At this point, there is no convincing evidence that the patient has a urinary tract infection. The etiology of her encephalopathy and overall decline is unclear. Will discuss goals of care with family today.      Brief History:     Luis Eduardo Euceda is a 80 y.o. female with a past medical history of colon cancer (s/p partial colectomy), DVT (s/p Bela filter placement; on Eliquis) and HLD who presented to the emergency department on 7/29/2022 after being found to be altered at home. According to chart review, the patient was diagnosed with a urinary tract infection outpatient but was refusing to take antibiotics for unclear reasons. In the ED, the patient was afebrile and nontoxic appearing but was altered and confused. CT scan of the head, chest, abdomen and pelvis were done and were negative for a source of infection. Urinalysis was not suggestive of infection although this may have been falsely negative due to taking a few doses of antibiotics at home. The patient is admitted to internal medicine for further management of acute toxic encephalopathy secondary to likely urinary tract infection. Review of Systems:     Unable to obtain secondary to altered mental status. Medications: Allergies:  No Known Allergies    Current Meds:   Scheduled Meds:    magnesium oxide  400 mg Oral Daily    pantoprazole  40 mg Oral BID AC    sodium chloride flush  5-40 mL IntraVENous 2 times per day    cefTRIAXone (ROCEPHIN) IV  1,000 mg IntraVENous Q24H    rosuvastatin  10 mg Oral Nightly     Continuous Infusions:    sodium chloride      sodium chloride 75 mL/hr at 07/30/22 0421    heparin (PORCINE) Infusion 15 Units/kg/hr (07/30/22 0130)     PRN Meds: ondansetron, sodium chloride flush, sodium chloride, potassium chloride **OR** potassium alternative oral replacement **OR** potassium chloride, magnesium sulfate, polyethylene glycol, acetaminophen **OR** acetaminophen, heparin (porcine), heparin (porcine)    Data:     Past Medical History:   has a past medical history of Anemia, Atrial fibrillation ([address] Cognitive communication deficit, Colon cancer ([address] Deep venous thrombosis of lower extremity ([address] GI bleed, Hyperlipidemia, Malnutrition ([address] Muscle weakness, and Rhabdomyolysis. Social History:   reports that she has never smoked. She has never been exposed to tobacco smoke.  She [id number], P5NDHTNT, O2SAT, FIO2  No results found for: SPECIAL  No results found for: CULTURE    Radiology:  CT ABDOMEN PELVIS WO CONTRAST Additional Contrast? None    Result Date: 7/29/2022  1. No acute infective or inflammatory process. 2. Bilateral small pleural effusion subsegmental atelectasis more pronounced on the left. 3. A 3.3 cm gallstone. No evidence for cholecystitis. 4. Sigmoid diverticulosis. 5. No bowel obstruction. CT HEAD WO CONTRAST    Result Date: 7/28/2022  No acute intracranial abnormality. XR CHEST PORTABLE    Result Date: 7/28/2022  Minor left basilar atelectasis and or pleural effusion. No other acute abnormality evident. CTA HEAD NECK W CONTRAST    Result Date: 7/29/2022  No evidence of stenosis or plaques in the bilateral common carotid arteries, bilateral internal carotid arteries and bilateral vertebral arteries, neck. No evidence of any vascular compromise in the bilateral intracranial cerebral arteries of the anterior and posterior circulations. Multiple nonenhancing hypodense lesions in the thyroid gland, most likely due to multiple cysts. Physical Examination:     General appearance:  Frail and chronically ill-appearing. Mental Status:  Alert and oriented x 0   Lungs:  clear to auscultation bilaterally, normal effort  Heart:  regular rate and rhythm, no murmur  Abdomen:  soft, nontender, nondistended, normal bowel sounds, no masses, hepatomegaly, splenomegaly. Surgical scar appreciated.    Extremities:  no edema, redness, tenderness in the calves  Skin:  no gross lesions, rashes, induration    Assessment:     Hospital Problems             Last Modified POA    * (Principal) Change in mental status 7/29/2022 Yes    Overlapping malignant neoplasm of colon ([address] 7/29/2022 Yes    Hypertension 7/29/2022 Yes    Hyperlipidemia 7/29/2022 Yes    DVT (deep venous thrombosis) ([address] 7/29/2022 Yes    Toxic encephalopathy 7/29/2022 Yes       Plan:     Acute toxic metabolic encephalopathy  -Possibly secondary to urinary tract infection  -Ceftriaxone 1 gram q24h  -Urine culture pending  -Maintenance fluids at 75 mL/hr  -CT and CTA head and neck negative for an acute intracranial abnormality  -CT abdomen and pelvis negative for acute intra-abdominal process  -CXR showing minor left basilar atelectasis with no evidence of infectious process  -TSH with reflex   -Ammonia pending   -Daily CBC  -Daily BMP     Elevated troponin  -Likely secondary to demand ischemia  -Continue to trend  -Echocardiogram negative for wall motion abnormalities   -Consult cardiology; appreciate recommendations     History of DVT  -[place] filter in place  -Holding home Eliquis 2/2 dysphagia  -Start heparin infusion      Hx of colon cancer  -S/P partial colectomy  -Follow-up with oncology outpatient as scheduled     HLD  -Continue home rosuvastatin     Dysphagia   -Secondary to physical debility and multiple underlying medical co-morbidities   -Consult palliative care; appreciate recommendations   -Will discuss goals of care with family     Shawna Preston MD  7/30/2022  8:15 AM

## 2022-07-30 NOTE — PLAN OF CARE
Problem: Discharge Planning  Goal: Discharge to home or other facility with appropriate resources  Outcome: Progressing     Problem: Skin/Tissue Integrity  Goal: Absence of new skin breakdown  Description: 1. Monitor for areas of redness and/or skin breakdown  2. Assess vascular access sites hourly  3. Every 4-6 hours minimum:  Change oxygen saturation probe site  4. Every 4-6 hours:  If on nasal continuous positive airway pressure, respiratory therapy assess nares and determine need for appliance change or resting period. Outcome: Progressing   Patient turned q2h and prn. Mepilex and barrier cream applied.      Problem: ABCDS Injury Assessment  Goal: Absence of physical injury  Outcome: Progressing

## 2022-07-30 NOTE — PLAN OF CARE
Problem: Skin/Tissue Integrity  Goal: Absence of new skin breakdown  Turn and reposition in bed, pressure reducing mattress, monitoring skin with every assessment and prn.

## 2022-07-30 NOTE — PROGRESS NOTES
Patient coughing after swallowing applesauce and stated It was hard to swallow. Chandni Small CNP, notified. New orders placed (See orders).      OK to hold 0700 medication per NP.

## 2022-07-30 NOTE — PROGRESS NOTES
Speech Language Pathology  Facility/Department: [facility] ICU   CLINICAL BEDSIDE SWALLOW EVALUATION    NAME: Moy Radford  : 1935  MRN: [id number]    ADMISSION DATE: 2022  ADMITTING DIAGNOSIS: has Change in mental status; Overlapping malignant neoplasm of colon ([facility] Hypertension; Hyperlipidemia; DVT (deep venous thrombosis) ([facility] and Toxic encephalopathy on their problem list.      Recent Chest Xray/CT of Chest: per chart (22)    Impression   Minor left basilar atelectasis and or pleural effusion. No other acute   abnormality evident. Date of Eval: 2022  Evaluating Therapist: SHALOM Gutierrez    Current Diet level:  Current Diet : Pureed with thin liquids (Prior to bedside swallow evaluation)      Primary Complaint  Patient Complaint: none    Pain:  Pain Assessment  Pain Assessment: None - Denies Pain    Reason for Referral  Moy Radford was referred for a bedside swallow evaluation to assess the efficiency of her swallow function, identify signs and symptoms of aspiration and make recommendations regarding safe dietary consistencies, effective compensatory strategies, and safe eating environment. Impression  Dysphagia Diagnosis: Moderate oral stage dysphagia; Moderate pharyngeal stage dysphagia  Dysphagia Outcome Severity Scale: Level 4: Mild moderate dysphagia- Intermittent supervision/cueing. One - two diet consistencies restricted     Treatment Plan  Requires SLP Intervention: Yes     D/C Recommendations: Ongoing speech therapy is recommended at next level of care       Recommended Diet and Intervention   Patient presents with probable safe swallow for Puree diet with mildly thickened liquids as evidenced by no overt s/s of aspiration noted with consistencies tested. Recommend small sips and bites, only feed when alert and awake and upright at 90 degrees for all PO intake.  Recommend close monitoring for overt/clinical s/s of aspiration and D/C PO intake and complete Modified Barium Swallow Study should they occur. Results and recommendations reported to RN. Pt agreeable to PO trials initially but eventually declining soft solid/regular solid trials citing pain. Pt appears safe to consume puree/mildly thickened liquids at this time. Recommended Form of Meds: Crushed in puree as able     Therapeutic Interventions: Therapeutic PO trials with SLP    Compensatory Swallowing Strategies  Compensatory Swallowing Strategies : Upright as possible for all oral intake;Remain upright for 30-45 minutes after meals;Small bites/sips    Treatment/Goals  Dysphagia Goals: The patient will tolerate recommended diet without observed clinical signs of aspiration    General  Chart Reviewed: Yes  Comments: Per RN, pt has hx of dysphagia and was admitted with dysphagia diet. RN reports that pt's dtr was in this AM and stated that she has had an MBSS in the past and that \"thickened liquids\" were recommended. Dtr reported that she is not thickening liquids currently. Pt is currently on a puree diet with thin liquids per chart prior to evaluation  Subjective  Subjective: Pt denies swallowing difficulty  Behavior/Cognition: Confused; Requires cueing; Alert  Communication Observation: Functional  Follows Directions: Simple  Dentition: Adequate  Patient Positioning: Upright in bed  Baseline Vocal Quality: Normal  Volitional Swallow: Delayed  Prior Dysphagia History: Unable to locate recent MBSS in EPIC chart/care everywhere. Hard chart indicates that pt was on a modified diet (mechanical soft, thin liquids, initiated on 7/11/22) while at [facility]. Vision/Hearing  Vision  Vision Exceptions: Wears glasses for reading  Hearing  Hearing: Within functional limits    Oral Motor Deficits   Mild lingual weakness noted during OME, pt unable to produce volitional swallow with max cues provided.      Oral Phase Dysfunction  Oral Phase  Oral Phase: Exceptions  Oral Phase  Oral Phase - Comment: Pt observed holding thin liquids in mouth (3-5 seconds) with immediate cough noted 1/4 trials via straw. Pt demonstrated no overt s/s of aspiration with mildly thickened liquids x 2. Pt declining additional trials of liquids with MAX cues provided. Indicators of Pharyngeal Phase Dysfunction   Pharyngeal Phase   Pharyngeal Phase: Immediate throat clear observed 1/4 thin liquid trials via straw. No overt s/s of aspiration noted with puree and nectar thickened liquids. Pt refusing trials of soft solid/regular solid citing pain.     Prognosis  Individuals consulted  Consulted and agree with results and recommendations: Patient;RN    Education  Patient Education: yes  Patient Education Response: No evidence of learning;Needs reinforcement             Therapy Time   [id number]            SHALOM Matson M.A., Mozella Berliner   7/30/2022 3:30 PM

## 2022-07-31 LAB
HCT VFR BLD CALC: 26.4 % (ref 36–46)
HEMOGLOBIN: 8.6 G/DL (ref 12–16)
MCH RBC QN AUTO: 30 PG (ref 26–34)
MCHC RBC AUTO-ENTMCNC: 32.6 G/DL (ref 31–37)
MCV RBC AUTO: 92 FL (ref 80–100)
PARTIAL THROMBOPLASTIN TIME: 65.9 SEC (ref 21.3–31.3)
PARTIAL THROMBOPLASTIN TIME: 76.9 SEC (ref 21.3–31.3)
PDW BLD-RTO: 18.3 % (ref 12.5–15.4)
PLATELET # BLD: 340 K/UL (ref 140–450)
PMV BLD AUTO: 11.1 FL (ref 8–14)
RBC # BLD: 2.87 M/UL (ref 4–5.2)
WBC # BLD: 5.5 K/UL (ref 3.5–11)

## 2022-07-31 PROCEDURE — 99232 SBSQ HOSP IP/OBS MODERATE 35: CPT | Performed by: STUDENT IN AN ORGANIZED HEALTH CARE EDUCATION/TRAINING PROGRAM

## 2022-07-31 PROCEDURE — 2580000003 HC RX 258: Performed by: CLINICAL NURSE SPECIALIST

## 2022-07-31 PROCEDURE — 2580000003 HC RX 258: Performed by: STUDENT IN AN ORGANIZED HEALTH CARE EDUCATION/TRAINING PROGRAM

## 2022-07-31 PROCEDURE — 85027 COMPLETE CBC AUTOMATED: CPT

## 2022-07-31 PROCEDURE — 1210000000 HC MED SURG R&B

## 2022-07-31 PROCEDURE — 6370000000 HC RX 637 (ALT 250 FOR IP): Performed by: CLINICAL NURSE SPECIALIST

## 2022-07-31 PROCEDURE — 36415 COLL VENOUS BLD VENIPUNCTURE: CPT

## 2022-07-31 PROCEDURE — 6360000002 HC RX W HCPCS: Performed by: STUDENT IN AN ORGANIZED HEALTH CARE EDUCATION/TRAINING PROGRAM

## 2022-07-31 PROCEDURE — 85730 THROMBOPLASTIN TIME PARTIAL: CPT

## 2022-07-31 RX ADMIN — HEPARIN SODIUM AND DEXTROSE 14 UNITS/KG/HR: 10000; 5 INJECTION INTRAVENOUS at 01:06

## 2022-07-31 RX ADMIN — CEFTRIAXONE SODIUM 1000 MG: 1 INJECTION, POWDER, FOR SOLUTION INTRAMUSCULAR; INTRAVENOUS at 11:47

## 2022-07-31 RX ADMIN — SODIUM CHLORIDE, PRESERVATIVE FREE 10 ML: 5 INJECTION INTRAVENOUS at 09:20

## 2022-07-31 RX ADMIN — SODIUM CHLORIDE: 9 INJECTION, SOLUTION INTRAVENOUS at 18:52

## 2022-07-31 RX ADMIN — MAGNESIUM OXIDE TAB 400 MG (241.3 MG ELEMENTAL MG) 400 MG: 400 (241.3 MG) TAB at 09:20

## 2022-07-31 RX ADMIN — SODIUM CHLORIDE, PRESERVATIVE FREE 10 ML: 5 INJECTION INTRAVENOUS at 20:00

## 2022-07-31 RX ADMIN — PANTOPRAZOLE SODIUM 40 MG: 40 TABLET, DELAYED RELEASE ORAL at 06:18

## 2022-07-31 NOTE — PROGRESS NOTES
Dr. Farrah Scott updated patient had a run of tachycardia/SVT nonsustained. Patient asymptomatic. Vitals as charted. No new orders received.

## 2022-07-31 NOTE — PLAN OF CARE
Problem: Discharge Planning  Goal: Discharge to home or other facility with appropriate resources  7/31/2022 0912 by Maria Luisa Goyal RN  Outcome: Progressing  Plan to return to [facility] at discharge per daughter.

## 2022-07-31 NOTE — PROGRESS NOTES
Discussed code status and goals of care with the patient's daughter and KIM Chauhan at bedside. Harman Gracia states that she would like to stop aggressive care and pursue Hospice services at this time.  Code status has been changed to [facility] per family's request.       Katerina Bowman MD   Hospitalist

## 2022-07-31 NOTE — PROGRESS NOTES
BEART, BE, THGBART, THB, [id number], [id number], [id number], O2SAT, FIO2  No results found for: SPECIAL  No results found for: CULTURE    Radiology:  CT ABDOMEN PELVIS WO CONTRAST Additional Contrast? None    Result Date: 7/29/2022  1. No acute infective or inflammatory process. 2. Bilateral small pleural effusion subsegmental atelectasis more pronounced on the left. 3. A 3.3 cm gallstone. No evidence for cholecystitis. 4. Sigmoid diverticulosis. 5. No bowel obstruction. CT HEAD WO CONTRAST    Result Date: 7/28/2022  No acute intracranial abnormality. XR CHEST PORTABLE    Result Date: 7/28/2022  Minor left basilar atelectasis and or pleural effusion. No other acute abnormality evident. CTA HEAD NECK W CONTRAST    Result Date: 7/29/2022  No evidence of stenosis or plaques in the bilateral common carotid arteries, bilateral internal carotid arteries and bilateral vertebral arteries, neck. No evidence of any vascular compromise in the bilateral intracranial cerebral arteries of the anterior and posterior circulations. Multiple nonenhancing hypodense lesions in the thyroid gland, most likely due to multiple cysts. Physical Examination:     General appearance:  Frail and chronically ill-appearing. Mental Status:  Alert and oriented x 0   Lungs:  clear to auscultation bilaterally, normal effort  Heart:  regular rate and rhythm, no murmur  Abdomen:  soft, nontender, nondistended, normal bowel sounds, no masses, hepatomegaly, splenomegaly. Surgical scar appreciated.    Extremities:  no edema, redness, tenderness in the calves  Skin:  no gross lesions, rashes, induration    Assessment:     Hospital Problems             Last Modified POA    * (Principal) Change in mental status 7/29/2022 Yes    Overlapping malignant neoplasm of colon ([address] 7/29/2022 Yes    Hypertension 7/29/2022 Yes    Hyperlipidemia 7/29/2022 Yes    DVT (deep venous thrombosis) ([address] 7/29/2022 Yes    Toxic encephalopathy 7/29/2022 Yes Plan:     Acute toxic metabolic encephalopathy  -Possibly secondary to urinary tract infection  -Ceftriaxone 1 gram q24h  -Urine culture pending  -Maintenance fluids at 75 mL/hr  -CT and CTA head and neck negative for an acute intracranial abnormality  -CT abdomen and pelvis negative for acute intra-abdominal process  -CXR showing minor left basilar atelectasis with no evidence of infectious process  -TSH with reflex WNL   -Ammonia pending WNL   -Daily CBC  -Daily BMP     Elevated troponin  -Likely secondary to demand ischemia  -Continue to trend  -Echocardiogram negative for wall motion abnormalities   -Consult cardiology; appreciate recommendations     History of DVT  -[place] filter in place  -Holding home Eliquis 2/2 dysphagia  -Continue heparin infusion      Hx of colon cancer  -S/P partial colectomy  -Follow-up with oncology outpatient as scheduled     HLD  -Continue home rosuvastatin     Dysphagia   -Secondary to physical debility and multiple underlying medical co-morbidities   -Consult palliative care; appreciate recommendations   -Will discuss goals of care with family     Inez Bardales MD  7/31/2022  12:05 PM

## 2022-07-31 NOTE — PROGRESS NOTES
Writer contacted [facility], per family request.  Per Harmony Silva will contact the unit tomorrow, 8/1. To set up an appointment for an evaluation, along with contacting Erika Rosario, next of kin.

## 2022-08-01 PROBLEM — Z71.89 ACP (ADVANCE CARE PLANNING): Status: ACTIVE | Noted: 2022-08-01

## 2022-08-01 PROBLEM — Z51.5 ENCOUNTER FOR PALLIATIVE CARE: Status: ACTIVE | Noted: 2022-08-01

## 2022-08-01 PROBLEM — N39.0 URINARY TRACT INFECTION WITH HEMATURIA: Status: ACTIVE | Noted: 2022-08-01

## 2022-08-01 PROBLEM — R31.9 URINARY TRACT INFECTION WITH HEMATURIA: Status: ACTIVE | Noted: 2022-08-01

## 2022-08-01 PROBLEM — E86.0 DEHYDRATION: Status: ACTIVE | Noted: 2022-08-01

## 2022-08-01 PROBLEM — Z71.89 GOALS OF CARE, COUNSELING/DISCUSSION: Status: ACTIVE | Noted: 2022-08-01

## 2022-08-01 LAB
CULTURE: ABNORMAL
PARTIAL THROMBOPLASTIN TIME: 90.8 SEC (ref 21.3–31.3)
SPECIMEN DESCRIPTION: ABNORMAL

## 2022-08-01 PROCEDURE — 6370000000 HC RX 637 (ALT 250 FOR IP): Performed by: CLINICAL NURSE SPECIALIST

## 2022-08-01 PROCEDURE — 2580000003 HC RX 258: Performed by: CLINICAL NURSE SPECIALIST

## 2022-08-01 PROCEDURE — 6370000000 HC RX 637 (ALT 250 FOR IP): Performed by: STUDENT IN AN ORGANIZED HEALTH CARE EDUCATION/TRAINING PROGRAM

## 2022-08-01 PROCEDURE — 6360000002 HC RX W HCPCS: Performed by: NURSE PRACTITIONER

## 2022-08-01 PROCEDURE — 36415 COLL VENOUS BLD VENIPUNCTURE: CPT

## 2022-08-01 PROCEDURE — 1210000000 HC MED SURG R&B

## 2022-08-01 PROCEDURE — 85730 THROMBOPLASTIN TIME PARTIAL: CPT

## 2022-08-01 PROCEDURE — 99222 1ST HOSP IP/OBS MODERATE 55: CPT | Performed by: NURSE PRACTITIONER

## 2022-08-01 PROCEDURE — 99232 SBSQ HOSP IP/OBS MODERATE 35: CPT | Performed by: STUDENT IN AN ORGANIZED HEALTH CARE EDUCATION/TRAINING PROGRAM

## 2022-08-01 RX ORDER — MORPHINE SULFATE 2 MG/ML
1 INJECTION, SOLUTION INTRAMUSCULAR; INTRAVENOUS
Status: DISCONTINUED | OUTPATIENT
Start: 2022-08-01 | End: 2022-08-02 | Stop reason: HOSPADM

## 2022-08-01 RX ADMIN — APIXABAN 5 MG: 5 TABLET, FILM COATED ORAL at 09:34

## 2022-08-01 RX ADMIN — SODIUM CHLORIDE, PRESERVATIVE FREE 10 ML: 5 INJECTION INTRAVENOUS at 09:34

## 2022-08-01 RX ADMIN — MORPHINE SULFATE 1 MG: 2 INJECTION, SOLUTION INTRAMUSCULAR; INTRAVENOUS at 20:33

## 2022-08-01 RX ADMIN — SODIUM CHLORIDE, PRESERVATIVE FREE 10 ML: 5 INJECTION INTRAVENOUS at 21:52

## 2022-08-01 RX ADMIN — MAGNESIUM OXIDE TAB 400 MG (241.3 MG ELEMENTAL MG) 400 MG: 400 (241.3 MG) TAB at 09:34

## 2022-08-01 RX ADMIN — APIXABAN 5 MG: 5 TABLET, FILM COATED ORAL at 20:33

## 2022-08-01 RX ADMIN — MORPHINE SULFATE 1 MG: 2 INJECTION, SOLUTION INTRAMUSCULAR; INTRAVENOUS at 22:27

## 2022-08-01 ASSESSMENT — PAIN SCALES - GENERAL
PAINLEVEL_OUTOF10: 3
PAINLEVEL_OUTOF10: 8
PAINLEVEL_OUTOF10: 0

## 2022-08-01 ASSESSMENT — PAIN SCALES - WONG BAKER
WONGBAKER_NUMERICALRESPONSE: 0
WONGBAKER_NUMERICALRESPONSE: 0

## 2022-08-01 ASSESSMENT — PAIN DESCRIPTION - LOCATION: LOCATION: BACK

## 2022-08-01 NOTE — PROGRESS NOTES
Physical Therapy        Physical Therapy Cancel Note      DATE: 2022    NAME: José Luis Valles  MRN: [id number]   : 1935      Patient not seen this date for Physical Therapy due to:    Patient is not appropriate for PT evaluation/treatment at this time d/t Hospice consult today.       Electronically signed by Karen Baltazar PT on 2022 at 10:44 AM

## 2022-08-01 NOTE — PLAN OF CARE
Problem: Discharge Planning  Goal: Discharge to home or other facility with appropriate resources  Outcome: Not Progressing     Problem: Skin/Tissue Integrity  Goal: Absence of new skin breakdown  Description: 1. Monitor for areas of redness and/or skin breakdown  2. Assess vascular access sites hourly  3. Every 4-6 hours minimum:  Change oxygen saturation probe site  4. Every 4-6 hours:  If on nasal continuous positive airway pressure, respiratory therapy assess nares and determine need for appliance change or resting period. Outcome: Not Progressing     Problem: Discharge Planning  Goal: Discharge to home or other facility with appropriate resources  Outcome: Not Progressing     Problem: Skin/Tissue Integrity  Goal: Absence of new skin breakdown  Description: 1. Monitor for areas of redness and/or skin breakdown  2. Assess vascular access sites hourly  3. Every 4-6 hours minimum:  Change oxygen saturation probe site  4. Every 4-6 hours:  If on nasal continuous positive airway pressure, respiratory therapy assess nares and determine need for appliance change or resting period. Outcome: Not Progressing   Patient turned q2h and prn. Medpilex and barrier cream applied.      Problem: ABCDS Injury Assessment  Goal: Absence of physical injury  Outcome: Not Progressing

## 2022-08-01 NOTE — DISCHARGE INSTR - COC
Continuity of Care Form    Patient Name: Tatyana José   :  1935  MRN:  [id number]    Admit date:  2022  Discharge date:  22    Code Status Order: DNR-CC   Advance Directives:     Admitting Physician:  Katerina Bowman MD  PCP: Tyler Bunch DO    Discharging Nurse: Celeste Miranda Unit/Room#: 682/307-20  Discharging Unit Phone Number: [phone]    Emergency Contact:   Extended Emergency Contact Information  Primary Emergency Contact: brennon colon)  Home Phone: [phone]  Relation: Child  Secondary Emergency Contact: winnie padilla  Home Phone: [phone]  Relation: Child    Past Surgical History:  Past Surgical History:   Procedure Laterality Date    COLON SURGERY         Immunization History:   Immunization History   Administered Date(s) Administered    COVID-19, PFIZER PURPLE top, DILUTE for use, (age 15 y+), 30mcg/0.3mL 2021, 2021, 2021       Active Problems:  Patient Active Problem List   Diagnosis Code    Change in mental status R41.82    Overlapping malignant neoplasm of colon ([facility] C18.8    Hypertension I10    Hyperlipidemia E78.5    DVT (deep venous thrombosis) ([facility] [address] [facility] I82.409    Toxic encephalopathy G92.9       Isolation/Infection:   Isolation            No Isolation          Patient Infection Status       None to display            Nurse Assessment:  Last Vital Signs: BP (!) 145/76   Pulse 90   Temp 98 °F (36.7 °C) (Axillary)   Resp 17   Ht 5' 3\" (1.6 m)   Wt 123 lb 14.4 oz (56.2 kg)   SpO2 99%   BMI 21.95 kg/m²     Last documented pain score (0-10 scale):    Last Weight:   Wt Readings from Last 1 Encounters:   22 123 lb 14.4 oz (56.2 kg)     Mental Status:  disoriented and alert    IV Access:  - None    Nursing Mobility/ADLs:  Walking   Assisted  Transfer  Assisted  Bathing  Assisted  Dressing  Assisted  Toileting  Assisted  Feeding  Assisted  Med Admin  Assisted  Med Delivery   prefers mixed with applesauce    Wound Care Documentation and Therapy:        Elimination:  Continence: Bowel: No  Bladder: No  Urinary Catheter: None   Colostomy/Ileostomy/Ileal Conduit: No       Date of Last BM: 8/1/22    Intake/Output Summary (Last 24 hours) at 8/1/2022 0634  Last data filed at 8/1/2022 0544  Gross per 24 hour   Intake 1719.62 ml   Output 1000 ml   Net 719.62 ml     I/O last 3 completed shifts: In: 1892.9 [I.V.:1892.9]  Out: 900 [Urine:900]    Safety Concerns: At Risk for Falls    Impairments/Disabilities:      None    Nutrition Therapy:  Current Nutrition Therapy:   - Oral Diet:  Dysphagia 1 pureed    Routes of Feeding: Oral  Liquids: Honey Thick Liquids  Daily Fluid Restriction: no  Last Modified Barium Swallow with Video (Video Swallowing Test): not done    Treatments at the Time of Hospital Discharge:   Respiratory Treatments:   Oxygen Therapy:  is not on home oxygen therapy.   Ventilator:    - No ventilator support    Rehab Therapies: Physical Therapy, Occupational Therapy, and Speech/Language Therapy  Weight Bearing Status/Restrictions: No weight bearing restrictions  Other Medical Equipment (for information only, NOT a DME order):  wheelchair, walker, bedside commode, and hospital bed  Other Treatments:     Patient's personal belongings (please select all that are sent with patient):  elisabeth Ewing RN SIGNATURE:  Electronically signed by Magali Arredondo RN on 8/2/22 at 10:54 AM EDT    CASE MANAGEMENT/SOCIAL WORK SECTION    Inpatient Status Date:     Readmission Risk Assessment Score:  Readmission Risk              Risk of Unplanned Readmission:  [id number]           Discharging to Facility/ Agency   Name: [facility]  Address: [address] [place] [address].  Phone: [phone]   Fax:    Dialysis Facility (if applicable)   Name:  Address:  Dialysis Schedule:  Phone:  Fax:    / signature: Electronically signed by Peggy Bartlett RN on 8/2/22 at 10:02 AM EDT    PHYSICIAN SECTION    Prognosis: Poor    Condition at Discharge: Stable    Rehab Potential (if transferring to Rehab): Poor    Recommended Labs or Other Treatments After Discharge: Hospice care     Physician Certification: I certify the above information and transfer of Jacobo Orozco  is necessary for the continuing treatment of the diagnosis listed and that she requires SNF for less than 30 days    Update Admission H&P: No change in H&P    PHYSICIAN SIGNATURE:  Electronically signed by Antelmo Longoria MD on 8/1/22 at 6:34 AM EDT

## 2022-08-01 NOTE — CONSULTS
Dyspnea:  none                          Fatigue:   generalized weakness     Other:      2- Goals of care evaluation   The patient goals of care are provide comfort care/support/palliation/relieve suffering   Goals of care discussed with:    [] Patient independently    [] Patient and Family    [x] Family or Healthcare DPOA independently    [] Unable to discuss with patient, family/DPOA not present    3- Code Status  DNR-CC    4- Other recommendations   - We will continue to provide comfort and support to the patient and the family    Palliative Care will continue to follow Ms. Michelle's care as needed. History of Present Illness     The patient is a 80 y.o. Non- / non  female who presents with Altered Mental Status (Brought by [facility]- altered mental status d/t untreated UTI -pt is refusing abx )    Elvi Martines is a 80year old female with medical history of malignant neoplasm of colon with colectomy of 6/22, hyperlipidemia, hypertension, DVT, atrial fib, malnutrition, and dementia. She was brought in from [facility] due to altered mental status and UTI positive in which patient refused antibiotic therapy. In the ED, the patient was afebrile and nontoxic appearing but was altered and confused. CT scan of the head, chest, abdomen and pelvis were done and were negative for a source of infection. Urinalysis was not suggestive of infection although this may have been falsely negative due to taking a few doses of antibiotics at home. The patient is admitted to internal medicine for further management of acute toxic encephalopathy secondary to likely urinary tract infection. Patient resting in bed and is now a [facility] code status. [address] will be in to meet with family today at 230 pm to discuss Inpatient hospice at [facility].      Palliative care will continue to follow          Referred to Palliative Care by   [x] Physician   [] Nursing  [] Family Request   [] Other: She was admitted to the Medical surgical  service for Dehydration [E86.0]  Change in mental status [R41.82]  Urinary tract infection with hematuria, site unspecified [N39.0, R31.9]  Altered mental status, unspecified altered mental status type [R41.82]. Her hospital course has been associated with Change in mental status. The patient has a complicated medical history and has been hospitalized since 7/28/2022  9:19 PM.    Active Hospital Problems    Diagnosis Date Noted    Change in mental status [R41.82] 07/29/2022     Priority: Medium    Overlapping malignant neoplasm of colon ([facility] [C18.8] 07/29/2022     Priority: Medium    DVT [facility]deep venous thrombosis) [facility] [I82.409] 07/29/2022     Priority: Medium    Toxic encephalopathy [G92.9] 07/29/2022     Priority: Medium    Hypertension [I10] 12/16/2015     Priority: Medium    Hyperlipidemia [E78.5] 12/16/2015     Priority: Medium       PAST MEDICAL HISTORY      Diagnosis Date    Anemia     Atrial fibrillation ([facility]     Cognitive communication deficit     Colon cancer ([facility]     Deep venous thrombosis of lower extremity (HCC)     GI bleed     Hyperlipidemia     Malnutrition ([facility]     Muscle weakness     Rhabdomyolysis        PAST SURGICAL HISTORY  Past Surgical History:   Procedure Laterality Date    COLON SURGERY         SOCIAL HISTORY  Social History     Tobacco Use    Smoking status: Never     Passive exposure: Never    Smokeless tobacco: Never   Substance Use Topics    Alcohol use: Not Currently       FAMILY HISTORY  . .History reviewed. No pertinent family history.      ALLERGIES  No Known Allergies      MEDICATIONS  Current Medications    apixaban  5 mg Oral BID    magnesium oxide  400 mg Oral Daily    pantoprazole  40 mg Oral BID AC    sodium chloride flush  5-40 mL IntraVENous 2 times per day    [Held by provider] rosuvastatin  10 mg Oral Nightly     ondansetron, sodium chloride flush, sodium chloride, potassium chloride **OR** potassium alternative oral replacement **OR** potassium chloride, magnesium sulfate, polyethylene glycol, acetaminophen **OR** acetaminophen  IV Drips/Infusions   sodium chloride       Home Medications  No current facility-administered medications on file prior to encounter. Current Outpatient Medications on File Prior to Encounter   Medication Sig Dispense Refill    apixaban (ELIQUIS) 5 MG TABS tablet Take 5 mg by mouth in the morning and 5 mg before bedtime. magnesium oxide (MAG-OX) 400 MG tablet Take 400 mg by mouth in the morning. omeprazole (PRILOSEC) 20 MG delayed release capsule Take 20 mg by mouth in the morning and 20 mg in the evening. ondansetron (ZOFRAN) 4 MG tablet Take 4 mg by mouth every 8 hours as needed for Nausea or Vomiting      Rosuvastatin Calcium 10 MG CPSP Take by mouth daily      acetaminophen (TYLENOL) 325 MG tablet Take 650 mg by mouth every 6 hours as needed for Pain         Data         BP (!) 146/64   Pulse 70   Temp 97.5 °F (36.4 °C) (Axillary)   Resp 14   Ht 5' 3\" (1.6 m)   Wt 123 lb 14.4 oz (56.2 kg)   SpO2 99%   BMI 21.95 kg/m²     Wt Readings from Last 3 Encounters:   08/01/22 123 lb 14.4 oz (56.2 kg)        Lab Results   Component Value Date    WBC 5.5 07/31/2022    HGB 8.6 (L) 07/31/2022    HCT 26.4 (L) 07/31/2022    MCV 92.0 07/31/2022     07/31/2022    ,   Lab Results   Component Value Date/Time     07/29/2022 05:45 AM    K 3.2 07/29/2022 05:45 AM     07/29/2022 05:45 AM    CO2 25 07/29/2022 05:45 AM    BUN 16 07/29/2022 05:45 AM    CREATININE 0.97 07/29/2022 05:45 AM    GLUCOSE 87 07/29/2022 05:45 AM    CALCIUM 9.4 07/29/2022 05:45 AM    ,   Lab Results   Component Value Date/Time    MG 1.2 07/29/2022 05:45 AM    ,  Lab Results   Component Value Date    CALCIUM 9.4 07/29/2022        Xray Result (most recent):  XR CHEST PORTABLE 07/28/2022    Narrative  EXAMINATION:  ONE XRAY VIEW OF THE CHEST    7/28/2022 10:36 pm    COMPARISON:  None.     HISTORY:  ORDERING SYSTEM PROVIDED HISTORY: Cough  TECHNOLOGIST PROVIDED HISTORY:  Cough  Reason for Exam: confusion    FINDINGS:  Normal heart size and pulmonary vasculature. There is mild left basilar  atelectasis and or small pleural effusion. The lungs otherwise clear and are  normally expanded. Surrounding osseous and soft tissue structures are noted  for an IVC filter and T12 vertebroplasty. Impression  Minor left basilar atelectasis and or pleural effusion. No other acute  abnormality evident. MRI Result (most recent): MRI LUMBAR SPINE WO CONTRAST 01/13/2016    Narrative  MRI lumbar spine without intravenous contrast, 1/13/2016    History: Fracture. Technique: Multiplanar multisequence MR imaging of the lumbar spine was performed without intravenous contrast.    Findings: Mild anterolisthesis of L4 on L5. Transitional vertebrae with last lumbar type vertebral body count is L5 level. Visualized bowel cord demonstrates no evidence for cord edema. Conus terminus at approximately L1 level. Cauda equina nerve roots  follow spinal curvature. There is edema involving the T12 vertebral body noted the with approximately 30% loss of vertebral body height with mild retropulsion of the posterior superior cortex without significant central canal compromise. Findings are suggestive of acute T12  vertebral body compression fracture. Heterogeneous marrow signal intensity may relate with chronic anemia or osteopenia. Disc desiccation all levels. L1-L2 level: Bilateral facet arthrosis with broad-based disc bulge and marginal osteophytes with mild bilateral neuroforaminal narrowing without significant central canal stenosis. L2-L3 level: Mild facet hypertrophy with ligamentum flavum thickening and broad-based is bulge resulting in mild bilateral neuroforaminal narrowing without significant central canal stenosis.     L3-L4 level: Bilateral facet arthrosis and ligamentum flavum thickening and a broad-based disc bulge with foraminal level disc protrusions resulting in mild bilateral neuroforaminal narrowing without significant central canal stenosis. L4-5 level: Bilateral facet hypertrophy with ligamentum flavum thickening and buckling along with a broad-based disc bulge and anterolisthesis of L5 on S1 resulting in severe central canal stenosis or moderate bilateral neuroforaminal narrowing. L5-S1 level: No significant central canal stenosis or neuroforaminal narrowing. Perineural cysts in the sacral foramina. Extraforaminal right-sided synovial cyst in the paraspinous musculature. T2 hyperintense foci within the kidneys likely renal cyst.    Impression: Acute T12 vertebral body compression fracture with approximately 30 % loss of vertebral body height with minimal retropulsion of the posterior superior cortex. No associated epidural hematoma. No significant canal compromise    Lumbar spondylotic changes with severe central canal stenosis L4-5 level. A [employer] message communicated  via EHR and through phone conversation via the Radiology Hub connecting radiologist to health care provider on 1/13/2016 11:19 AM, [employer] [facility]. Final report electronically signed by Marky Jeffery M.D. on 1/13/2016 11:19 AM       CT Result (most recent):  CT ABDOMEN PELVIS WO IV CONTRAST 07/29/2022    Narrative  EXAMINATION:  CT OF THE ABDOMEN AND PELVIS WITHOUT CONTRAST 7/29/2022 8:10 am    TECHNIQUE:  CT of the abdomen and pelvis was performed without the administration of  intravenous contrast. Multiplanar reformatted images are provided for review. Automated exposure control, iterative reconstruction, and/or weight based  adjustment of the mA/kV was utilized to reduce the radiation dose to as low  as reasonably achievable.     COMPARISON:  No priors    HISTORY:  ORDERING SYSTEM PROVIDED HISTORY: [facility] abdominal pain  TECHNOLOGIST PROVIDED HISTORY:  Q abdominal pain    Reason for Exam: RLQ abdominal pain    FINDINGS:  Lower Chest: SMALL BILATERAL PLEURAL EFFUSION WITH ADJACENT MINOR  SUBSEGMENTAL ATELECTASIS MORE PRONOUNCED ON THE LEFT SIDE. Organs: Limited evaluation the absence of IV contrast.  Liver shows no focal  lesions. Splenic calcification noted. Pancreas and adrenal glands show no  significant abnormalities. 3.3 cm gallstone. No evidence for cholecystitis. Contrast in the collecting systems and ureters secondary to CT angiogram  performed last night. There appears to be parapelvic cysts bilaterally. Roxane Bloodgood GI/Bowel: There is limited evaluation due to absence of oral contrast.  There  is also motion degradation of images. There is no bowel obstruction. No  acute infective process evident in the GI tract. Sigmoid diverticulosis. Pelvis: Urinary bladder filled with contrast.  Uterus unremarkable. No  suspicious pelvic mass. Peritoneum/Retroperitoneum: No ascites. No significant lymphadenopathy. Atherosclerotic disease. IVC filter in place. Bones/Soft Tissues: Osteopenia with superimposed diffuse degenerative  changes. T12 kyphoplasty. Impression  1. No acute infective or inflammatory process. 2. Bilateral small pleural effusion subsegmental atelectasis more pronounced  on the left. 3. A 3.3 cm gallstone. No evidence for cholecystitis. 4. Sigmoid diverticulosis. 5. No bowel obstruction. Code Status: DNR-CC     ADVANCED CARE PLANNING:  Patient has capacity for medical decisions: no  Health Care Power of : yes  Living Will: no     Personal, Social, and Family History  Marital Status:   Living situation:nursing home  Importance of barrington/Mandaeism/spiritual beliefs: [] Very [] Somewhat [] Not   Psychological Distress: moderate  Does patient understand diagnosis/treatment? no  Does caregiver understand diagnosis/treatment?  yes      Assessment        REVIEW OF SYSTEMS  Constitutional: Patient alert to person only answers some questions and follow few commands Eyes: eyes closed most of assessment   ENT: no hearing loss, congestion, or difficulty swallowing   Respiratory: patient is on room air respirations relaxed   Cardiovascular: HRI no cp or pressure   Gastrointestinal: no nausea, vomiting,abdominal pain, diarrhea or constipation, no melena   Genitourinary: no dysuria, frequency,hematuria , or nocturia   Musculoskeletal: generalized weakness   Skin: no rashes or sores   Neurological: Patient alert to person only answers some questions and follow few commands     PHYSICAL ASSESSMENT:  Constitutional: Patient alert to person only answers some questions and follow few commands   Head: Normocephalic and atraumatic. Eyes: eyes closed most of assessment   Neck: Normal range of motion. Neck supple. No tracheal deviation present. Cardiovascular: Normal rate and regular rhythm, S1, S2, no murmur   Pulmonary/Chest: Lungs diminished respirations relaxed   Abdomen: Soft. No tenderness, not distended, no ascites, no organomegaly   Musculoskeletal: Generalized weakness   Neurological: Patient alert to person only answers some questions and follow few commands   Skin: Normal turgor, no bleeding, no bruising     Palliative Performance Scale:  ___60%  Ambulation reduced; Significant disease; Can't do hobbies/housework; intake normal or reduced; occasional assist; LOC full/confusion  ___50%  Mainly sit/lie; Extensive disease; Can't do any work; Considerable assist; intake normal or reduced; LOC full/confusion  ___40%  Mainly in bed; Extensive disease; Mainly assist; intake normal or reduced; LOC full/confusion   _x__30%  Bed Bound; Extensive disease; Total care; intake reduced; LOCfull/confusion  ___20%  Bed Bound; Extensive disease; Total care; intake minimal; Drowsy/coma  ___10%  Bed Bound; Extensive disease; Total care; Mouth care only; Drowsy/coma  ___0       Death        Thank you for allowing Palliative Care to participate in the care of Ms. Voss Brochure .     This note has been

## 2022-08-01 NOTE — ACP (ADVANCE CARE PLANNING)
Advance Care Planning     Advance Care Planning (ACP) Physician/NP/PA Conversation    Date of Conversation: 8/1/2022  Conducted with: I spoke with Vivian Bagley patient Daughter and [facility]. POA     Healthcare Decision Maker:  Patient is  and has 2 biological children [facility] and [facility]. Patient has completed [facility]. POA and daughter Vivian Bagley will bring a copy to the hospital.        Care Preferences:    Hospitalization: \"If your health worsens and it becomes clear that your chance of recovery is unlikely, what would be your preference regarding hospitalization? \"  Hospitalized     Ventilation: \"If you were unable to breath on your own and your chance of recovery was unlikely, what would be your preference about the use of a ventilator (breathing machine) if it was available to you? \"  [facility]     Resuscitation: \"In the event your heart stopped as a result of an underlying serious health condition, would you want attempts made to restart your heart, or would you prefer a natural death? \"  [facility]    Conversation Outcomes / Follow-Up Plan:  Palliative Interaction:  I went and spoke with patient at bedside and she was alert to person only. She kept her eyes closed most of assessment. I called Vivian Bagley patient daughter [facility]. POA and introduced myself and my palliative care role. Patient is  and has 2 biological children [facility] and [facility]. Patient has completed [facility]. POA and daughter Vivian Bagley will bring a copy to the hospital.     Patient is a [facility] and has [address] coming out to hospital to evaluate her at 1430. Patient daughter Vivian Bagley is interested in inpatient [facility] facility. Comfort medications ordered for patient.  Palliative care will continue to follow    Length of Voluntary ACP Conversation in minutes:  16 minutes    ROBERTA Linares NP

## 2022-08-01 NOTE — CARE COORDINATION
Transitional Planning    Spoke with Letty Rodriguez at [facility]. Eval appointment confirmed for today at 0.     1 [id number] Ste[address], RN met with patient and daughter at bedside. At this time daughter has decided not to pursue hospice services. Daughter would like patient to return to [address] [place] [address]. Writer contacted Kimberly Urbano, arturo and [address] [place] [address], to update and inquire if they would accept patient back.

## 2022-08-01 NOTE — PROGRESS NOTES
[facility]  Office: [address], DO, Hardy Arthur, DO, Pebbles Page, DO, Nadeem Bowen, DO, Linda Horner MD, Barbara James MD, Kvng Jung MD, Pily Lees MD,  Tiki West MD, Kate Galicia MD, Heladio Kinney, DO, Axel Pendleton MD,  Elijah Tony MD, Keshawn Santamaria MD, Junito Gifford, DO, Beatriz Garner MD, Denise Gaines MD, Sony Ward MD, Gerard Sow DO, Zohra Jung MD, Tamera Gloria MD, Juana Davila, CNP,  Gricelda Brain, CNP, Molly Guillen, CNP, Tor Richmond, CNP, Kina Stanley PA-C, Gretta Watt, DNP, Mervat Spencer, CNP, Sally Garcia, CNP, Last Chicas, CNP, Una Napier, CNP, Amaris Caicedo, CNP, Joy Holliday, CNS, Kathy Head, DNP, Gabriela Molina, CNP, Zena Counter, CNP, Zainab Garcia, [address]    Progress Note    8/1/2022    8:45 AM    Name:   Nain Munson  MRN:     [id number]     Acct:      [de-identified]   Room:   [address] [place] [address] Day:  3  Admit Date:  7/28/2022  9:19 PM    PCP:   Demar López DO  Code Status:  DNR-CC    Subjective:     C/C:   Chief Complaint   Patient presents with    Altered Mental Status     Brought by squad- altered mental status d/t untreated UTI -pt is refusing abx      Interval History Status: not changed. Patient was seen and examined at bedside this AM. She continues to be encephalopathic and is alert and oriented x 0. Discussed goals of care with the patient's daughter and KIM Jones) at bedside yesterday. Gadiel Rodriguez is interested in Hospice placement. Plan for family meeting with Hospice today. Brief History:     Nain Munson is a 80 y.o. female with a past medical history of colon cancer (s/p partial colectomy), DVT (s/p [place] filter placement; on Eliquis) and HLD who presented to the emergency department on 7/29/2022 after being found to be altered at home.  According to chart review, the patient was diagnosed with a urinary tract infection outpatient but was refusing to take antibiotics for unclear reasons. In the ED, the patient was afebrile and nontoxic appearing but was altered and confused. CT scan of the head, chest, abdomen and pelvis were done and were negative for a source of infection. Urinalysis was not suggestive of infection although this may have been falsely negative due to taking a few doses of antibiotics at home. The patient is admitted to internal medicine for further management of acute toxic encephalopathy secondary to likely urinary tract infection. Review of Systems:     Unable to obtain secondary to altered mental status. Medications: Allergies:  No Known Allergies    Current Meds:   Scheduled Meds:    apixaban  5 mg Oral BID    magnesium oxide  400 mg Oral Daily    pantoprazole  40 mg Oral BID AC    sodium chloride flush  5-40 mL IntraVENous 2 times per day    [Held by provider] rosuvastatin  10 mg Oral Nightly     Continuous Infusions:    sodium chloride       PRN Meds: ondansetron, sodium chloride flush, sodium chloride, potassium chloride **OR** potassium alternative oral replacement **OR** potassium chloride, magnesium sulfate, polyethylene glycol, acetaminophen **OR** acetaminophen    Data:     Past Medical History:   has a past medical history of Anemia, Atrial fibrillation ([address] Cognitive communication deficit, Colon cancer ([address] Deep venous thrombosis of lower extremity ([address] GI bleed, Hyperlipidemia, Malnutrition ([address] Muscle weakness, and Rhabdomyolysis. Social History:   reports that she has never smoked. She has never been exposed to tobacco smoke. She has never used smokeless tobacco. She reports that she does not currently use alcohol. Family History: History reviewed. No pertinent family history.     Vitals:  BP (!) 146/64   Pulse 70   Temp 97.5 °F (36.4 °C) (Axillary)   Resp 14   Ht 5' 3\" (1.6 m)   Wt 123 lb 14.4 oz (56.2 kg)   SpO2 99%   BMI 21.95 kg/m²   Temp (24hrs), Av.9 °F (36.6 °C), Min:97.5 °F (36.4 °C), Max:98.1 °F (36.7 °C)    No results for input(s): POCGLU in the last 72 hours. I/O (24Hr): Intake/Output Summary (Last 24 hours) at 2022 0845  Last data filed at 2022 0544  Gross per 24 hour   Intake 1719.62 ml   Output 1000 ml   Net 719.62 ml       Labs:  Hematology:  Recent Labs     22  1614 22  0616   WBC 3.9 5.5   RBC 2.76* 2.87*   HGB 8.2* 8.6*   HCT 24.5* 26.4*   MCV 89.0 92.0   MCH 29.7 30.0   MCHC 33.4 32.6   RDW 19.0* 18.3*    340   MPV 8.7 11.1     Chemistry:  Recent Labs     22  1145 22  1614 22  2323   TROPHS 89* 91* 99*     Recent Labs     22  0750   TSH 2.13   AMMONIA 21     ABG:No results found for: POCPH, PHART, PH, POCPCO2, KQL2LQG, PCO2, POCPO2, PO2ART, PO2, POCHCO3, KPG2EUF, HCO3, NBEA, PBEA, BEART, BE, THGBART, THB, WQJ4PJR, GZVJ5ZJQ, B0CNFRYR, O2SAT, FIO2  No results found for: SPECIAL  Lab Results   Component Value Date/Time    CULTURE CULTURE IN PROGRESS 2022 06:37 PM       Radiology:  CT ABDOMEN PELVIS WO CONTRAST Additional Contrast? None    Result Date: 2022  1. No acute infective or inflammatory process. 2. Bilateral small pleural effusion subsegmental atelectasis more pronounced on the left. 3. A 3.3 cm gallstone. No evidence for cholecystitis. 4. Sigmoid diverticulosis. 5. No bowel obstruction. CT HEAD WO CONTRAST    Result Date: 2022  No acute intracranial abnormality. XR CHEST PORTABLE    Result Date: 2022  Minor left basilar atelectasis and or pleural effusion. No other acute abnormality evident. CTA HEAD NECK W CONTRAST    Result Date: 2022  No evidence of stenosis or plaques in the bilateral common carotid arteries, bilateral internal carotid arteries and bilateral vertebral arteries, neck.  No evidence of any vascular compromise in the bilateral intracranial cerebral arteries of the anterior and posterior circulations. Multiple nonenhancing hypodense lesions in the thyroid gland, most likely due to multiple cysts. Physical Examination:     General appearance:  Frail and chronically ill-appearing. Mental Status:  Alert and oriented x 0   Lungs:  clear to auscultation bilaterally, normal effort  Heart:  regular rate and rhythm, no murmur  Abdomen:  soft, nontender, nondistended, normal bowel sounds, no masses, hepatomegaly, splenomegaly. Surgical scar appreciated.    Extremities:  no edema, redness, tenderness in the calves  Skin:  no gross lesions, rashes, induration    Assessment:     Hospital Problems             Last Modified POA    * (Principal) Change in mental status 7/29/2022 Yes    Overlapping malignant neoplasm of colon ([facility] 7/29/2022 Yes    Hypertension 7/29/2022 Yes    Hyperlipidemia 7/29/2022 Yes    DVT (deep venous thrombosis) ([facility] 7/29/2022 Yes    Toxic encephalopathy 7/29/2022 Yes       Plan:     Acute toxic metabolic encephalopathy  -Possibly secondary to urinary tract infection  -Patient completed a course of ceftriaxone   -Urine culture pending  -Maintenance fluids at 75 mL/hr  -CT and CTA head and neck negative for an acute intracranial abnormality  -CT abdomen and pelvis negative for acute intra-abdominal process  -CXR showing minor left basilar atelectasis with no evidence of infectious process  -TSH with reflex WNL   -Ammonia WNL   -Daily CBC  -Daily BMP     Elevated troponin  -Likely secondary to demand ischemia  -Continue to trend  -Echocardiogram negative for wall motion abnormalities   -Cardiology following      History of DVT  -Bela filter in place  -Stop heparin infusion and resume home Eliquis today      Hx of colon cancer  -S/P partial colectomy  -Follow-up with oncology outpatient as scheduled     HLD  -Continue home rosuvastatin     Dysphagia   -Secondary to physical debility and multiple underlying medical co-morbidities   -Family is now interested in Hospice placement (family meeting scheduled for today)     Disposition: Awaiting Hospice placement.      Mali Alas MD  8/1/2022  8:45 AM

## 2022-08-02 VITALS
HEIGHT: 63 IN | HEART RATE: 71 BPM | BODY MASS INDEX: 21.95 KG/M2 | OXYGEN SATURATION: 98 % | WEIGHT: 123.9 LBS | SYSTOLIC BLOOD PRESSURE: 141 MMHG | TEMPERATURE: 97.9 F | DIASTOLIC BLOOD PRESSURE: 62 MMHG | RESPIRATION RATE: 16 BRPM

## 2022-08-02 LAB
HCT VFR BLD CALC: 27.1 % (ref 36–46)
HEMOGLOBIN: 9 G/DL (ref 12–16)
MCH RBC QN AUTO: 29.9 PG (ref 26–34)
MCHC RBC AUTO-ENTMCNC: 33.2 G/DL (ref 31–37)
MCV RBC AUTO: 90 FL (ref 80–100)
PDW BLD-RTO: 18.6 % (ref 12.5–15.4)
PLATELET # BLD: 274 K/UL (ref 140–450)
PMV BLD AUTO: 8.5 FL (ref 6–12)
RBC # BLD: 3.01 M/UL (ref 4–5.2)
WBC # BLD: 3.9 K/UL (ref 3.5–11)

## 2022-08-02 PROCEDURE — 6370000000 HC RX 637 (ALT 250 FOR IP): Performed by: STUDENT IN AN ORGANIZED HEALTH CARE EDUCATION/TRAINING PROGRAM

## 2022-08-02 PROCEDURE — 6370000000 HC RX 637 (ALT 250 FOR IP): Performed by: CLINICAL NURSE SPECIALIST

## 2022-08-02 PROCEDURE — 6360000002 HC RX W HCPCS: Performed by: NURSE PRACTITIONER

## 2022-08-02 PROCEDURE — 85027 COMPLETE CBC AUTOMATED: CPT

## 2022-08-02 PROCEDURE — 36415 COLL VENOUS BLD VENIPUNCTURE: CPT

## 2022-08-02 PROCEDURE — 2580000003 HC RX 258: Performed by: CLINICAL NURSE SPECIALIST

## 2022-08-02 RX ADMIN — SODIUM CHLORIDE, PRESERVATIVE FREE 10 ML: 5 INJECTION INTRAVENOUS at 07:55

## 2022-08-02 RX ADMIN — SODIUM CHLORIDE, PRESERVATIVE FREE 10 ML: 5 INJECTION INTRAVENOUS at 10:20

## 2022-08-02 RX ADMIN — MAGNESIUM OXIDE TAB 400 MG (241.3 MG ELEMENTAL MG) 400 MG: 400 (241.3 MG) TAB at 08:52

## 2022-08-02 RX ADMIN — APIXABAN 5 MG: 5 TABLET, FILM COATED ORAL at 08:52

## 2022-08-02 RX ADMIN — MORPHINE SULFATE 1 MG: 2 INJECTION, SOLUTION INTRAMUSCULAR; INTRAVENOUS at 10:20

## 2022-08-02 RX ADMIN — PANTOPRAZOLE SODIUM 40 MG: 40 TABLET, DELAYED RELEASE ORAL at 07:47

## 2022-08-02 ASSESSMENT — PAIN SCALES - GENERAL: PAINLEVEL_OUTOF10: 0

## 2022-08-02 ASSESSMENT — PAIN DESCRIPTION - LOCATION: LOCATION: GENERALIZED

## 2022-08-02 NOTE — DISCHARGE INSTR - DIET

## 2022-08-03 RX ORDER — LINEZOLID 600 MG/1
600 TABLET, FILM COATED ORAL 2 TIMES DAILY
Qty: 20 TABLET | Refills: 0 | Status: SHIPPED | OUTPATIENT
Start: 2022-08-03 | End: 2022-08-13

## 2022-08-03 NOTE — PROGRESS NOTES
Called the patient's daughter Barry Charter) and discussed results of the patient's urine culture. A prescription for Zyvox has been sent to the patient's pharmacy.      Sandrita Bhatt MD  Hospitalist

## 2022-08-03 NOTE — DISCHARGE SUMMARY
[facility]  Office: [address], DO, Prema Din, DO, Rosendo Acron, DO, Shana Flavin Blood, DO, Carlos Lam MD, Hernan Babin MD, Paulina Dominguez MD, Cele Nazario MD,  Guzman Ramírez MD, Mitch Lynn MD, Conrado Romero, DO, Kaylah Harris MD,  Richardson Hernandez MD, Juice Lorenzana MD, Sincere Grijalva, DO, Newton Childress MD, Felipa Vargas MD, Joseph Bates MD, Adi Rodgers, DO, Elijah Pleitez MD, Javid De Leon MD, Inna Cedeño, CNP,  Adelia Alpers, CNP, Katerin Daly, CNP, Radha Roe, CNP, Dominik Liao PA-C, Claire Qiu, DNP, Willy Evans, CNP, Ana Anderson, CNP, Babs Marroquin, CNP, Delmar Browning, CNP, Isa Jeffery, CNP, Salma Pittman, CNS, Lopez Marrero, [facility], Chris Anthony, CNP, Jose F Capps, CNP, Maria T Falcon, CNP           [facility]   [address] [facility]    Discharge Summary     Patient ID: Izabela Coy  :  1935   MRN: [id number]     ACCOUNT:  [de-identified]   Patient's PCP: Virgie Fisher DO  Admit Date: 2022   Discharge Date: 2022   Length of Stay: 4  Code Status:  DNR-CC  Admitting Physician: No admitting provider for patient encounter. Discharge Physician: Juice Lorenzana MD     Active Discharge Diagnoses:     Hospital Problem Lists:  Principal Problem:    Change in mental status  Active Problems:    Overlapping malignant neoplasm of colon ([facility]    Hypertension    Hyperlipidemia    DVT (deep venous thrombosis) ([facility]    Toxic encephalopathy    Urinary tract infection with hematuria    Dehydration    ACP (advance care planning)    Goals of care, counseling/discussion    Encounter for palliative care  Resolved Problems:    * No resolved hospital problems.  *      Admission Condition:  poor     Discharged Condition: fair    Hospital Stay:     Hospital Course:  Izabela Coy is a 80 y.o. female with a past medical history of colon cancer (s/p partial colectomy), DVT (s/p [place] filter placement; on Eliquis) and HLD who presented to the emergency department on 7/29/2022 after being found to be altered at home. According to chart review, the patient was diagnosed with a urinary tract infection outpatient but was refusing to take antibiotics for unclear reasons. In the ED, the patient was afebrile and nontoxic appearing but was altered and confused. CT scan of the head, chest, abdomen and pelvis were done and were negative for a source of infection. Urinalysis was not suggestive of infection although this may have been falsely negative due to taking a few doses of antibiotics at home. The patient was admitted to internal medicine for further management of acute toxic encephalopathy secondary to likely urinary tract infection. The patient's mental status minimally improved despite antibiotics. After discussing code status and goals of care with patient's daughter and KIM Nathan, the decision was made to change her code status to [facility] and pursue Hospice services. On 8/2, the patient's POA changed her mind and decided against pursuing Hospice. The patient was discharged to SNF on 8/2. This physician last cared for the patient on 8/1. Urine culture was pending at that time. The urine culture has resulted positive for vancomycin resistant Enterococcus as of this writing. Will discuss the culture results with the patient's daughter and prescribe a ten-day-course of linezolid. The patient is advised to follow-up with her primary care physician as needed.        Significant therapeutic interventions: IV fluids and antibiotics; palliative care consultation     Significant Diagnostic Studies:   Labs / Micro:  BMP:    Lab Results   Component Value Date/Time    GLUCOSE 87 07/29/2022 05:45 AM     07/29/2022 05:45 AM    K 3.2 07/29/2022 05:45 AM     07/29/2022 05:45 AM    CO2 25 07/29/2022 05:45 AM    ANIONGAP 11 07/29/2022 05:45 AM    BUN 16 07/29/2022 05:45 AM    CREATININE 0.97 07/29/2022 05:45 AM    CALCIUM 9.4 07/29/2022 05:45 AM    LABGLOM 54 07/29/2022 05:45 AM    GFRAA >60 07/29/2022 05:45 AM    GFR      07/29/2022 05:45 AM       Radiology:  CT ABDOMEN PELVIS WO CONTRAST Additional Contrast? None    Result Date: 7/29/2022  1. No acute infective or inflammatory process. 2. Bilateral small pleural effusion subsegmental atelectasis more pronounced on the left. 3. A 3.3 cm gallstone. No evidence for cholecystitis. 4. Sigmoid diverticulosis. 5. No bowel obstruction. CT HEAD WO CONTRAST    Result Date: 7/28/2022  No acute intracranial abnormality. XR CHEST PORTABLE    Result Date: 7/28/2022  Minor left basilar atelectasis and or pleural effusion. No other acute abnormality evident. CTA HEAD NECK W CONTRAST    Result Date: 7/29/2022  No evidence of stenosis or plaques in the bilateral common carotid arteries, bilateral internal carotid arteries and bilateral vertebral arteries, neck. No evidence of any vascular compromise in the bilateral intracranial cerebral arteries of the anterior and posterior circulations. Multiple nonenhancing hypodense lesions in the thyroid gland, most likely due to multiple cysts. Consultations:    Consults:     Final Specialist Recommendations/Findings:   IP CONSULT TO CARDIOLOGY  IP CONSULT TO PALLIATIVE CARE  IP CONSULT TO HOSPICE      The patient was seen and examined on day of discharge and this discharge summary is in conjunction with any daily progress note from day of discharge. Discharge plan:     Disposition: To a non-[facility] facility    Physician Follow Up:     [address] [place] [address]  [phone]           Requiring Further Evaluation/Follow Up POST HOSPITALIZATION/Incidental Findings: Patient will need to complete a 10-day course of linezolid and follow-up with her primary care physician as scheduled.      Diet: regular diet; as tolerated     Activity: As tolerated    Instructions to Patient: Complete a 10-day course of linezolid and follow-up with your primary care physician as needed. Discharge Medications:      Medication List        START taking these medications      linezolid 600 MG tablet  Commonly known as: ZYVOX  Take 1 tablet by mouth in the morning and 1 tablet before bedtime. Do all this for 10 days. CONTINUE taking these medications      acetaminophen 325 MG tablet  Commonly known as: TYLENOL     Eliquis 5 MG Tabs tablet  Generic drug: apixaban     magnesium oxide 400 MG tablet  Commonly known as: MAG-OX     omeprazole 20 MG delayed release capsule  Commonly known as: PRILOSEC     ondansetron 4 MG tablet  Commonly known as: ZOFRAN     Rosuvastatin Calcium 10 MG Cpsp            STOP taking these medications      nitrofurantoin (macrocrystal-monohydrate) 100 MG capsule  Commonly known as: MACROBID               Where to Get Your Medications        These medications were sent to [id number], [address] [place] [address] [phone] [facility], [address] [place] [address]      Phone: [phone]   linezolid 600 MG tablet         No discharge procedures on file. Time Spent on discharge is  20 mins in patient examination, evaluation, counseling as well as medication reconciliation, prescriptions for required medications, discharge plan and follow up. Electronically signed by   Deb Martin MD  8/3/2022  6:40 AM      Thank you Dr. Sharyn Rojas DO for the opportunity to be involved in this patient's care.

## 2022-08-31 PROBLEM — E86.0 DEHYDRATION: Status: RESOLVED | Noted: 2022-08-01 | Resolved: 2022-08-31
